# Patient Record
Sex: FEMALE | Race: WHITE | Employment: OTHER | URBAN - NONMETROPOLITAN AREA
[De-identification: names, ages, dates, MRNs, and addresses within clinical notes are randomized per-mention and may not be internally consistent; named-entity substitution may affect disease eponyms.]

---

## 2017-01-17 ENCOUNTER — OFFICE VISIT (OUTPATIENT)
Dept: FAMILY MEDICINE CLINIC | Age: 82
End: 2017-01-17

## 2017-01-17 ENCOUNTER — CARE COORDINATION (OUTPATIENT)
Dept: CARE COORDINATION | Age: 82
End: 2017-01-17

## 2017-01-17 VITALS
HEART RATE: 68 BPM | BODY MASS INDEX: 23.45 KG/M2 | DIASTOLIC BLOOD PRESSURE: 60 MMHG | SYSTOLIC BLOOD PRESSURE: 138 MMHG | WEIGHT: 128.2 LBS

## 2017-01-17 DIAGNOSIS — L30.9 DERMATITIS: Primary | ICD-10-CM

## 2017-01-17 PROCEDURE — G8420 CALC BMI NORM PARAMETERS: HCPCS | Performed by: FAMILY MEDICINE

## 2017-01-17 PROCEDURE — 1123F ACP DISCUSS/DSCN MKR DOCD: CPT | Performed by: FAMILY MEDICINE

## 2017-01-17 PROCEDURE — G8599 NO ASA/ANTIPLAT THER USE RNG: HCPCS | Performed by: FAMILY MEDICINE

## 2017-01-17 PROCEDURE — 99212 OFFICE O/P EST SF 10 MIN: CPT | Performed by: FAMILY MEDICINE

## 2017-01-17 PROCEDURE — 4040F PNEUMOC VAC/ADMIN/RCVD: CPT | Performed by: FAMILY MEDICINE

## 2017-01-17 PROCEDURE — 1036F TOBACCO NON-USER: CPT | Performed by: FAMILY MEDICINE

## 2017-01-17 PROCEDURE — G8484 FLU IMMUNIZE NO ADMIN: HCPCS | Performed by: FAMILY MEDICINE

## 2017-01-17 PROCEDURE — 1090F PRES/ABSN URINE INCON ASSESS: CPT | Performed by: FAMILY MEDICINE

## 2017-01-17 PROCEDURE — G8428 CUR MEDS NOT DOCUMENT: HCPCS | Performed by: FAMILY MEDICINE

## 2017-01-17 RX ORDER — AMLODIPINE BESYLATE 10 MG/1
10 TABLET ORAL DAILY
Qty: 30 TABLET | Refills: 3 | Status: SHIPPED | OUTPATIENT
Start: 2017-01-17 | End: 2017-04-25 | Stop reason: SDUPTHER

## 2017-01-17 RX ORDER — NYSTATIN 100000 [USP'U]/G
POWDER TOPICAL 2 TIMES DAILY
Qty: 1 BOTTLE | Refills: 5 | Status: SHIPPED | OUTPATIENT
Start: 2017-01-17

## 2017-01-20 RX ORDER — METOPROLOL TARTRATE 50 MG/1
50 TABLET, FILM COATED ORAL 2 TIMES DAILY
Qty: 180 TABLET | Refills: 1 | Status: SHIPPED | OUTPATIENT
Start: 2017-01-20 | End: 2017-05-04 | Stop reason: SDUPTHER

## 2017-01-20 RX ORDER — PANTOPRAZOLE SODIUM 40 MG/1
40 TABLET, DELAYED RELEASE ORAL
Qty: 90 TABLET | Refills: 1 | Status: SHIPPED | OUTPATIENT
Start: 2017-01-20 | End: 2017-05-04 | Stop reason: SDUPTHER

## 2017-02-09 ENCOUNTER — CARE COORDINATION (OUTPATIENT)
Dept: CARE COORDINATION | Age: 82
End: 2017-02-09

## 2017-03-09 ENCOUNTER — CARE COORDINATION (OUTPATIENT)
Dept: CARE COORDINATION | Age: 82
End: 2017-03-09

## 2017-03-14 ENCOUNTER — TELEPHONE (OUTPATIENT)
Dept: FAMILY MEDICINE CLINIC | Age: 82
End: 2017-03-14

## 2017-03-14 RX ORDER — PROMETHAZINE HYDROCHLORIDE 6.25 MG/5ML
6.25 SYRUP ORAL 3 TIMES DAILY PRN
Qty: 118 ML | Refills: 0 | Status: SHIPPED | OUTPATIENT
Start: 2017-03-14 | End: 2017-09-28 | Stop reason: SINTOL

## 2017-03-20 RX ORDER — GLIMEPIRIDE 1 MG/1
1 TABLET ORAL EVERY MORNING
Qty: 90 TABLET | Refills: 0 | Status: SHIPPED | OUTPATIENT
Start: 2017-03-20 | End: 2017-05-04 | Stop reason: SDUPTHER

## 2017-04-18 ENCOUNTER — CARE COORDINATION (OUTPATIENT)
Dept: CARE COORDINATION | Age: 82
End: 2017-04-18

## 2017-04-25 ENCOUNTER — OFFICE VISIT (OUTPATIENT)
Dept: FAMILY MEDICINE CLINIC | Age: 82
End: 2017-04-25

## 2017-04-25 VITALS — SYSTOLIC BLOOD PRESSURE: 123 MMHG | DIASTOLIC BLOOD PRESSURE: 64 MMHG | HEART RATE: 54 BPM

## 2017-04-25 DIAGNOSIS — F01.50 MULTI-INFARCT DEMENTIA WITHOUT BEHAVIORAL DISTURBANCE (HCC): Primary | ICD-10-CM

## 2017-04-25 DIAGNOSIS — M15.9 PRIMARY OSTEOARTHRITIS INVOLVING MULTIPLE JOINTS: ICD-10-CM

## 2017-04-25 DIAGNOSIS — E11.9 WELL CONTROLLED TYPE 2 DIABETES MELLITUS (HCC): ICD-10-CM

## 2017-04-25 DIAGNOSIS — N18.4 CKD (CHRONIC KIDNEY DISEASE) STAGE 4, GFR 15-29 ML/MIN (HCC): ICD-10-CM

## 2017-04-25 PROCEDURE — 1123F ACP DISCUSS/DSCN MKR DOCD: CPT | Performed by: FAMILY MEDICINE

## 2017-04-25 PROCEDURE — G8599 NO ASA/ANTIPLAT THER USE RNG: HCPCS | Performed by: FAMILY MEDICINE

## 2017-04-25 PROCEDURE — 1090F PRES/ABSN URINE INCON ASSESS: CPT | Performed by: FAMILY MEDICINE

## 2017-04-25 PROCEDURE — G8427 DOCREV CUR MEDS BY ELIG CLIN: HCPCS | Performed by: FAMILY MEDICINE

## 2017-04-25 PROCEDURE — 4040F PNEUMOC VAC/ADMIN/RCVD: CPT | Performed by: FAMILY MEDICINE

## 2017-04-25 PROCEDURE — 1036F TOBACCO NON-USER: CPT | Performed by: FAMILY MEDICINE

## 2017-04-25 PROCEDURE — 99213 OFFICE O/P EST LOW 20 MIN: CPT | Performed by: FAMILY MEDICINE

## 2017-04-25 PROCEDURE — G8420 CALC BMI NORM PARAMETERS: HCPCS | Performed by: FAMILY MEDICINE

## 2017-04-25 RX ORDER — AMLODIPINE BESYLATE 10 MG/1
10 TABLET ORAL DAILY
Qty: 30 TABLET | Refills: 3 | Status: SHIPPED | OUTPATIENT
Start: 2017-04-25 | End: 2017-05-04 | Stop reason: SDUPTHER

## 2017-05-04 RX ORDER — METOPROLOL TARTRATE 50 MG/1
50 TABLET, FILM COATED ORAL 2 TIMES DAILY
Qty: 180 TABLET | Refills: 1 | Status: SHIPPED | OUTPATIENT
Start: 2017-05-04

## 2017-05-04 RX ORDER — AMLODIPINE BESYLATE 10 MG/1
10 TABLET ORAL DAILY
Qty: 90 TABLET | Refills: 1 | Status: SHIPPED | OUTPATIENT
Start: 2017-05-04

## 2017-05-04 RX ORDER — GLIMEPIRIDE 1 MG/1
1 TABLET ORAL EVERY MORNING
Qty: 90 TABLET | Refills: 0 | Status: ON HOLD | OUTPATIENT
Start: 2017-05-04 | End: 2017-11-07 | Stop reason: HOSPADM

## 2017-05-04 RX ORDER — PANTOPRAZOLE SODIUM 40 MG/1
40 TABLET, DELAYED RELEASE ORAL
Qty: 90 TABLET | Refills: 1 | Status: SHIPPED | OUTPATIENT
Start: 2017-05-04 | End: 2017-08-24 | Stop reason: SINTOL

## 2017-05-15 ENCOUNTER — CARE COORDINATION (OUTPATIENT)
Dept: CARE COORDINATION | Age: 82
End: 2017-05-15

## 2017-06-21 ENCOUNTER — CARE COORDINATION (OUTPATIENT)
Dept: CARE COORDINATION | Age: 82
End: 2017-06-21

## 2017-06-22 ENCOUNTER — TELEPHONE (OUTPATIENT)
Dept: FAMILY MEDICINE CLINIC | Age: 82
End: 2017-06-22

## 2017-06-22 RX ORDER — TRIAMCINOLONE ACETONIDE 1 MG/G
CREAM TOPICAL
Qty: 28 G | Refills: 0 | Status: SHIPPED | OUTPATIENT
Start: 2017-06-22 | End: 2017-08-10 | Stop reason: SDUPTHER

## 2017-08-10 ENCOUNTER — OFFICE VISIT (OUTPATIENT)
Dept: FAMILY MEDICINE CLINIC | Age: 82
End: 2017-08-10
Payer: MEDICARE

## 2017-08-10 VITALS
SYSTOLIC BLOOD PRESSURE: 124 MMHG | HEART RATE: 66 BPM | DIASTOLIC BLOOD PRESSURE: 70 MMHG | BODY MASS INDEX: 22.16 KG/M2 | WEIGHT: 120.4 LBS | HEIGHT: 62 IN

## 2017-08-10 DIAGNOSIS — E11.9 TYPE 2 DIABETES MELLITUS WITHOUT COMPLICATION, WITHOUT LONG-TERM CURRENT USE OF INSULIN (HCC): Primary | Chronic | ICD-10-CM

## 2017-08-10 DIAGNOSIS — R21 RASH: ICD-10-CM

## 2017-08-10 DIAGNOSIS — E08.21 DIABETES MELLITUS DUE TO UNDERLYING CONDITION WITH DIABETIC NEPHROPATHY, WITHOUT LONG-TERM CURRENT USE OF INSULIN (HCC): ICD-10-CM

## 2017-08-10 DIAGNOSIS — F01.50 MULTI-INFARCT DEMENTIA WITHOUT BEHAVIORAL DISTURBANCE (HCC): Chronic | ICD-10-CM

## 2017-08-10 DIAGNOSIS — I35.0 MODERATE AORTIC STENOSIS: ICD-10-CM

## 2017-08-10 DIAGNOSIS — I10 ESSENTIAL HYPERTENSION: Chronic | ICD-10-CM

## 2017-08-10 PROCEDURE — 1036F TOBACCO NON-USER: CPT | Performed by: FAMILY MEDICINE

## 2017-08-10 PROCEDURE — G8427 DOCREV CUR MEDS BY ELIG CLIN: HCPCS | Performed by: FAMILY MEDICINE

## 2017-08-10 PROCEDURE — 99214 OFFICE O/P EST MOD 30 MIN: CPT | Performed by: FAMILY MEDICINE

## 2017-08-10 PROCEDURE — 4040F PNEUMOC VAC/ADMIN/RCVD: CPT | Performed by: FAMILY MEDICINE

## 2017-08-10 PROCEDURE — 1123F ACP DISCUSS/DSCN MKR DOCD: CPT | Performed by: FAMILY MEDICINE

## 2017-08-10 PROCEDURE — 1090F PRES/ABSN URINE INCON ASSESS: CPT | Performed by: FAMILY MEDICINE

## 2017-08-10 PROCEDURE — G8599 NO ASA/ANTIPLAT THER USE RNG: HCPCS | Performed by: FAMILY MEDICINE

## 2017-08-10 PROCEDURE — G8420 CALC BMI NORM PARAMETERS: HCPCS | Performed by: FAMILY MEDICINE

## 2017-08-10 RX ORDER — TRIAMCINOLONE ACETONIDE 1 MG/G
CREAM TOPICAL
Qty: 28 G | Refills: 0 | Status: SHIPPED | OUTPATIENT
Start: 2017-08-10

## 2017-08-10 ASSESSMENT — ENCOUNTER SYMPTOMS
SHORTNESS OF BREATH: 0
WHEEZING: 0

## 2017-08-11 LAB
ABSOLUTE BASO #: 0.1 K/UL (ref 0–0.1)
ABSOLUTE EOS #: 0.5 K/UL (ref 0.1–0.4)
ABSOLUTE LYMPH #: 1.5 K/UL (ref 0.8–5.2)
ABSOLUTE MONO #: 0.5 K/UL (ref 0.1–0.9)
ABSOLUTE NEUT #: 6.3 K/UL (ref 1.3–9.1)
ALBUMIN SERPL-MCNC: 3.6 G/DL (ref 3.2–5.3)
ALK PHOSPHATASE: 80 IU/L (ref 35–121)
ALT SERPL-CCNC: 7 IU/L (ref 5–59)
ANION GAP SERPL CALCULATED.3IONS-SCNC: 16 MMOL/L
AST SERPL-CCNC: 8 IU/L (ref 10–42)
AVERAGE GLUCOSE: 120 MG/DL (ref 66–114)
BASOPHILS RELATIVE PERCENT: 0.6 %
BILIRUB SERPL-MCNC: 0.4 MG/DL (ref 0.2–1.3)
BUN BLDV-MCNC: 38 MG/DL (ref 9–24)
CALCIUM SERPL-MCNC: 9.5 MG/DL (ref 8.7–10.8)
CHLORIDE BLD-SCNC: 103 MMOL/L (ref 95–111)
CO2: 24 MMOL/L (ref 21–32)
CREAT SERPL-MCNC: 3.4 MG/DL (ref 0.5–1.3)
EGFR AFRICAN AMERICAN: 15
EGFR IF NONAFRICAN AMERICAN: 13
EOSINOPHILS RELATIVE PERCENT: 5.1 %
GLUCOSE: 161 MG/DL (ref 70–100)
HBA1C MFR BLD: 5.8 % (ref 4.2–5.8)
HCT VFR BLD CALC: 28 % (ref 36–48)
HEMOGLOBIN: 9.1 G/DL (ref 12–16)
LYMPHOCYTE %: 16.7 %
MCH RBC QN AUTO: 27.8 PG (ref 27–34)
MCHC RBC AUTO-ENTMCNC: 32.5 G/DL (ref 31–36)
MCV RBC AUTO: 85.6 FL (ref 80–100)
MONOCYTES # BLD: 5.4 %
NEUTROPHILS RELATIVE PERCENT: 71.5 %
PDW BLD-RTO: 15.7 % (ref 10.8–14.8)
PLATELETS: 353 K/UL (ref 150–450)
POTASSIUM SERPL-SCNC: 4.7 MMOL/L (ref 3.5–5.4)
RBC: 3.27 M/UL (ref 4–5.5)
SODIUM BLD-SCNC: 138 MMOL/L (ref 134–147)
TOTAL PROTEIN: 7.3 G/DL (ref 5.8–8)
WBC: 8.8 K/UL (ref 3.7–10.8)

## 2017-08-12 ENCOUNTER — TELEPHONE (OUTPATIENT)
Dept: FAMILY MEDICINE CLINIC | Age: 82
End: 2017-08-12

## 2017-08-12 DIAGNOSIS — N18.4 CHRONIC KIDNEY DISEASE (CKD), STAGE 4 (SEVERE): Primary | ICD-10-CM

## 2017-08-12 PROBLEM — N18.9 CHRONIC KIDNEY DISEASE (CKD): Status: ACTIVE | Noted: 2017-08-12

## 2017-08-17 ENCOUNTER — HOSPITAL ENCOUNTER (OUTPATIENT)
Age: 82
Discharge: HOME | End: 2017-08-17
Payer: MEDICARE

## 2017-08-17 DIAGNOSIS — N18.4 CHRONIC KIDNEY DISEASE (CKD), STAGE 4 (SEVERE): ICD-10-CM

## 2017-08-17 LAB
ANION GAP SERPL CALCULATED.3IONS-SCNC: 17 MEQ/L (ref 8–16)
BUN BLDV-MCNC: 42 MG/DL (ref 7–22)
CALCIUM SERPL-MCNC: 9.2 MG/DL (ref 8.5–10.5)
CHLORIDE BLD-SCNC: 100 MEQ/L (ref 98–111)
CO2: 22 MEQ/L (ref 23–33)
CREAT SERPL-MCNC: 3.5 MG/DL (ref 0.4–1.2)
GFR SERPL CREATININE-BSD FRML MDRD: 12 ML/MIN/1.73M2
GLUCOSE BLD-MCNC: 264 MG/DL (ref 70–108)
POTASSIUM SERPL-SCNC: 5 MEQ/L (ref 3.5–5.2)
SODIUM BLD-SCNC: 139 MEQ/L (ref 135–145)

## 2017-08-17 PROCEDURE — 36415 COLL VENOUS BLD VENIPUNCTURE: CPT

## 2017-08-17 PROCEDURE — 80048 BASIC METABOLIC PNL TOTAL CA: CPT

## 2017-08-18 ENCOUNTER — TELEPHONE (OUTPATIENT)
Dept: FAMILY MEDICINE CLINIC | Age: 82
End: 2017-08-18

## 2017-08-18 ENCOUNTER — TELEPHONE (OUTPATIENT)
Dept: NEPHROLOGY | Age: 82
End: 2017-08-18

## 2017-08-24 ENCOUNTER — OFFICE VISIT (OUTPATIENT)
Dept: NEPHROLOGY | Age: 82
End: 2017-08-24
Payer: MEDICARE

## 2017-08-24 VITALS
DIASTOLIC BLOOD PRESSURE: 78 MMHG | BODY MASS INDEX: 23.69 KG/M2 | WEIGHT: 129.5 LBS | HEART RATE: 65 BPM | SYSTOLIC BLOOD PRESSURE: 162 MMHG | OXYGEN SATURATION: 91 %

## 2017-08-24 DIAGNOSIS — I10 ESSENTIAL HYPERTENSION: Chronic | ICD-10-CM

## 2017-08-24 DIAGNOSIS — N18.4 CKD (CHRONIC KIDNEY DISEASE) STAGE 4, GFR 15-29 ML/MIN (HCC): Primary | ICD-10-CM

## 2017-08-24 DIAGNOSIS — N18.5 CHRONIC KIDNEY DISEASE, STAGE V (VERY SEVERE) (HCC): ICD-10-CM

## 2017-08-24 DIAGNOSIS — N18.9 ANEMIA IN CHRONIC KIDNEY DISEASE: ICD-10-CM

## 2017-08-24 DIAGNOSIS — D63.1 ANEMIA IN CHRONIC KIDNEY DISEASE: ICD-10-CM

## 2017-08-24 DIAGNOSIS — N18.4 TYPE 2 DIABETES MELLITUS WITH STAGE 4 CHRONIC KIDNEY DISEASE, WITHOUT LONG-TERM CURRENT USE OF INSULIN (HCC): ICD-10-CM

## 2017-08-24 DIAGNOSIS — E11.22 TYPE 2 DIABETES MELLITUS WITH STAGE 4 CHRONIC KIDNEY DISEASE, WITHOUT LONG-TERM CURRENT USE OF INSULIN (HCC): ICD-10-CM

## 2017-08-24 PROCEDURE — 1090F PRES/ABSN URINE INCON ASSESS: CPT | Performed by: NURSE PRACTITIONER

## 2017-08-24 PROCEDURE — 99213 OFFICE O/P EST LOW 20 MIN: CPT | Performed by: NURSE PRACTITIONER

## 2017-08-24 PROCEDURE — 4040F PNEUMOC VAC/ADMIN/RCVD: CPT | Performed by: NURSE PRACTITIONER

## 2017-08-24 PROCEDURE — G8427 DOCREV CUR MEDS BY ELIG CLIN: HCPCS | Performed by: NURSE PRACTITIONER

## 2017-08-24 PROCEDURE — G8420 CALC BMI NORM PARAMETERS: HCPCS | Performed by: NURSE PRACTITIONER

## 2017-08-24 PROCEDURE — 1036F TOBACCO NON-USER: CPT | Performed by: NURSE PRACTITIONER

## 2017-08-24 PROCEDURE — 1123F ACP DISCUSS/DSCN MKR DOCD: CPT | Performed by: NURSE PRACTITIONER

## 2017-08-24 PROCEDURE — G8599 NO ASA/ANTIPLAT THER USE RNG: HCPCS | Performed by: NURSE PRACTITIONER

## 2017-09-25 ENCOUNTER — HOSPITAL ENCOUNTER (OUTPATIENT)
Age: 82
Discharge: HOME | End: 2017-09-25
Payer: MEDICARE

## 2017-09-25 DIAGNOSIS — N18.4 CKD (CHRONIC KIDNEY DISEASE) STAGE 4, GFR 15-29 ML/MIN (HCC): ICD-10-CM

## 2017-09-25 LAB
ANION GAP SERPL CALCULATED.3IONS-SCNC: 20 MEQ/L (ref 8–16)
BUN BLDV-MCNC: 47 MG/DL (ref 7–22)
CALCIUM SERPL-MCNC: 9.4 MG/DL (ref 8.5–10.5)
CHLORIDE BLD-SCNC: 101 MEQ/L (ref 98–111)
CO2: 19 MEQ/L (ref 23–33)
CREAT SERPL-MCNC: 3.3 MG/DL (ref 0.4–1.2)
GFR SERPL CREATININE-BSD FRML MDRD: 13 ML/MIN/1.73M2
GLUCOSE BLD-MCNC: 119 MG/DL (ref 70–108)
PHOSPHORUS: 4.4 MG/DL (ref 2.4–4.7)
POTASSIUM SERPL-SCNC: 4.4 MEQ/L (ref 3.5–5.2)
PTH INTACT: 95.2 PG/ML (ref 15–65)
SODIUM BLD-SCNC: 140 MEQ/L (ref 135–145)
VITAMIN D 25-HYDROXY: 20 NG/ML (ref 30–100)

## 2017-09-25 PROCEDURE — 80048 BASIC METABOLIC PNL TOTAL CA: CPT

## 2017-09-25 PROCEDURE — 84100 ASSAY OF PHOSPHORUS: CPT

## 2017-09-25 PROCEDURE — 83970 ASSAY OF PARATHORMONE: CPT

## 2017-09-25 PROCEDURE — 82306 VITAMIN D 25 HYDROXY: CPT

## 2017-09-25 PROCEDURE — 36415 COLL VENOUS BLD VENIPUNCTURE: CPT

## 2017-09-28 ENCOUNTER — OFFICE VISIT (OUTPATIENT)
Dept: NEPHROLOGY | Age: 82
End: 2017-09-28
Payer: MEDICARE

## 2017-09-28 VITALS — SYSTOLIC BLOOD PRESSURE: 152 MMHG | OXYGEN SATURATION: 96 % | HEART RATE: 59 BPM | DIASTOLIC BLOOD PRESSURE: 72 MMHG

## 2017-09-28 DIAGNOSIS — E87.20 METABOLIC ACIDOSIS: ICD-10-CM

## 2017-09-28 DIAGNOSIS — E11.22 TYPE 2 DIABETES MELLITUS WITH STAGE 4 CHRONIC KIDNEY DISEASE, WITHOUT LONG-TERM CURRENT USE OF INSULIN (HCC): ICD-10-CM

## 2017-09-28 DIAGNOSIS — N18.4 CKD (CHRONIC KIDNEY DISEASE) STAGE 4, GFR 15-29 ML/MIN (HCC): Primary | ICD-10-CM

## 2017-09-28 DIAGNOSIS — D63.1 ANEMIA IN CHRONIC KIDNEY DISEASE: ICD-10-CM

## 2017-09-28 DIAGNOSIS — N18.9 ANEMIA IN CHRONIC KIDNEY DISEASE: ICD-10-CM

## 2017-09-28 DIAGNOSIS — N18.4 TYPE 2 DIABETES MELLITUS WITH STAGE 4 CHRONIC KIDNEY DISEASE, WITHOUT LONG-TERM CURRENT USE OF INSULIN (HCC): ICD-10-CM

## 2017-09-28 DIAGNOSIS — E55.9 VITAMIN D DEFICIENCY: ICD-10-CM

## 2017-09-28 PROCEDURE — G8420 CALC BMI NORM PARAMETERS: HCPCS | Performed by: NURSE PRACTITIONER

## 2017-09-28 PROCEDURE — 1036F TOBACCO NON-USER: CPT | Performed by: NURSE PRACTITIONER

## 2017-09-28 PROCEDURE — 4040F PNEUMOC VAC/ADMIN/RCVD: CPT | Performed by: NURSE PRACTITIONER

## 2017-09-28 PROCEDURE — 99213 OFFICE O/P EST LOW 20 MIN: CPT | Performed by: NURSE PRACTITIONER

## 2017-09-28 PROCEDURE — 1123F ACP DISCUSS/DSCN MKR DOCD: CPT | Performed by: NURSE PRACTITIONER

## 2017-09-28 PROCEDURE — 1090F PRES/ABSN URINE INCON ASSESS: CPT | Performed by: NURSE PRACTITIONER

## 2017-09-28 PROCEDURE — G8599 NO ASA/ANTIPLAT THER USE RNG: HCPCS | Performed by: NURSE PRACTITIONER

## 2017-09-28 PROCEDURE — G8427 DOCREV CUR MEDS BY ELIG CLIN: HCPCS | Performed by: NURSE PRACTITIONER

## 2017-09-28 RX ORDER — SODIUM BICARBONATE 650 MG/1
650 TABLET ORAL 2 TIMES DAILY
Qty: 60 TABLET | Refills: 3 | Status: SHIPPED | OUTPATIENT
Start: 2017-09-28 | End: 2018-09-28

## 2017-10-31 ENCOUNTER — TELEPHONE (OUTPATIENT)
Dept: FAMILY MEDICINE CLINIC | Age: 82
End: 2017-10-31

## 2017-10-31 NOTE — TELEPHONE ENCOUNTER
Riley Fournier called in about her Noriega Shank. She says that Sydney Valdes has fell twice, doesn't want to eat, and states her stomach is upset. They were told that her kidney function is only about 25%. I discussed what they were thinking and they feel that maybe they would like Hospice in to evaluate her. She wonders if you will need to see Sydney Valdes in the office or not. We are to call Riley Fournier back.

## 2017-11-01 NOTE — TELEPHONE ENCOUNTER
Recommend appt in office to discuss. We can have hospice in to evaluate. Please advise patient.   Shania Huston MD

## 2017-11-02 ENCOUNTER — HOSPITAL ENCOUNTER (INPATIENT)
Age: 82
LOS: 4 days | Discharge: NURSING, REHAB OR LONG-TERM CARE | DRG: 689 | End: 2017-11-07
Attending: NURSE PRACTITIONER | Admitting: INTERNAL MEDICINE
Payer: MEDICARE

## 2017-11-02 ENCOUNTER — APPOINTMENT (OUTPATIENT)
Dept: CT IMAGING | Age: 82
DRG: 689 | End: 2017-11-02
Payer: MEDICARE

## 2017-11-02 DIAGNOSIS — R77.8 ELEVATED TROPONIN: Primary | ICD-10-CM

## 2017-11-02 DIAGNOSIS — N18.9 CHRONIC KIDNEY DISEASE, UNSPECIFIED CKD STAGE: ICD-10-CM

## 2017-11-02 DIAGNOSIS — D64.9 ANEMIA, UNSPECIFIED TYPE: ICD-10-CM

## 2017-11-02 DIAGNOSIS — R10.30 LOWER ABDOMINAL PAIN: ICD-10-CM

## 2017-11-02 LAB
ALBUMIN SERPL-MCNC: 3 G/DL (ref 3.5–5.1)
ALP BLD-CCNC: 111 U/L (ref 38–126)
ALT SERPL-CCNC: 9 U/L (ref 11–66)
ANION GAP SERPL CALCULATED.3IONS-SCNC: 22 MEQ/L (ref 8–16)
ANISOCYTOSIS: ABNORMAL
ANISOCYTOSIS: ABNORMAL
AST SERPL-CCNC: 15 U/L (ref 5–40)
BASOPHILS # BLD: 0 %
BASOPHILS # BLD: 0.3 %
BASOPHILS ABSOLUTE: 0 THOU/MM3 (ref 0–0.1)
BASOPHILS ABSOLUTE: 0 THOU/MM3 (ref 0–0.1)
BILIRUB SERPL-MCNC: 0.4 MG/DL (ref 0.3–1.2)
BILIRUBIN DIRECT: < 0.2 MG/DL (ref 0–0.3)
BILIRUBIN URINE: NEGATIVE
BLOOD, URINE: ABNORMAL
BUN BLDV-MCNC: 42 MG/DL (ref 7–22)
BUN, WHOLE BLOOD: 48 MG/DL (ref 8–26)
CALCIUM SERPL-MCNC: 8.4 MG/DL (ref 8.5–10.5)
CHARACTER, URINE: CLEAR
CHLORIDE BLD-SCNC: 91 MEQ/L (ref 98–111)
CHLORIDE, WHOLE BLOOD: 97 MEQ/L (ref 98–109)
CO2: 21 MEQ/L (ref 23–33)
COLOR: YELLOW
CREAT SERPL-MCNC: 3.6 MG/DL (ref 0.4–1.2)
CREAT SERPL-MCNC: 4.1 MG/DL (ref 0.5–1.2)
EOSINOPHIL # BLD: 2.3 %
EOSINOPHIL # BLD: 3.8 %
EOSINOPHILS ABSOLUTE: 0.3 THOU/MM3 (ref 0–0.4)
EOSINOPHILS ABSOLUTE: 0.5 THOU/MM3 (ref 0–0.4)
GFR SERPL CREATININE-BSD FRML MDRD: 12 ML/MIN/1.73M2
GFR, ESTIMATED: 11 ML/MIN/1.73M2
GLUCOSE BLD-MCNC: 136 MG/DL (ref 70–108)
GLUCOSE BLD-MCNC: 170 MG/DL (ref 70–108)
GLUCOSE, URINE: NEGATIVE MG/DL
GLUCOSE, WHOLE BLOOD: 43 MG/DL (ref 70–108)
HCT VFR BLD CALC: 23.3 % (ref 37–47)
HCT VFR BLD CALC: 25.3 % (ref 37–47)
HEMOGLOBIN: 7.4 GM/DL (ref 12–16)
HEMOGLOBIN: 8.2 GM/DL (ref 12–16)
HYPOCHROMIA: ABNORMAL
KETONES, URINE: NEGATIVE
LACTIC ACID: 1.5 MMOL/L (ref 0.5–2.2)
LEUKOCYTES, UA: ABNORMAL
LIPASE: 35 U/L (ref 5.6–51.3)
LYMPHOCYTES # BLD: 9.4 %
LYMPHOCYTES # BLD: 9.5 %
LYMPHOCYTES ABSOLUTE: 1.2 THOU/MM3 (ref 1–4.8)
LYMPHOCYTES ABSOLUTE: 1.3 THOU/MM3 (ref 1–4.8)
MAGNESIUM: 2.1 MG/DL (ref 1.6–2.4)
MCH RBC QN AUTO: 26.2 PG (ref 27–31)
MCH RBC QN AUTO: 26.9 PG (ref 27–31)
MCHC RBC AUTO-ENTMCNC: 32 GM/DL (ref 33–37)
MCHC RBC AUTO-ENTMCNC: 32.3 GM/DL (ref 33–37)
MCV RBC AUTO: 82 FL (ref 81–99)
MCV RBC AUTO: 83.3 FL (ref 81–99)
MONOCYTES # BLD: 3.1 %
MONOCYTES # BLD: 3.9 %
MONOCYTES ABSOLUTE: 0.4 THOU/MM3 (ref 0.4–1.3)
MONOCYTES ABSOLUTE: 0.5 THOU/MM3 (ref 0.4–1.3)
NITRITE, URINE: NEGATIVE
NUCLEATED RED BLOOD CELLS: 0 /100 WBC
NUCLEATED RED BLOOD CELLS: 0 /100 WBC
OSMOLALITY CALCULATION: 280.8 MOSMOL/KG (ref 275–300)
PDW BLD-RTO: 14.7 % (ref 11.5–14.5)
PDW BLD-RTO: 17.8 % (ref 11.5–14.5)
PH UA: 6 (ref 5–9)
PLATELET # BLD: 211 THOU/MM3 (ref 130–400)
PLATELET # BLD: 485 THOU/MM3 (ref 130–400)
PLATELET ESTIMATE: ADEQUATE
PMV BLD AUTO: 6.8 MCM (ref 7.4–10.4)
PMV BLD AUTO: 7.4 MCM (ref 7.4–10.4)
POIKILOCYTES: SLIGHT
POTASSIUM SERPL-SCNC: 3.7 MEQ/L (ref 3.5–5.2)
POTASSIUM, WHOLE BLOOD: 4.1 MEQ/L (ref 3.5–4.9)
PROTEIN UA: >= 300 MG/DL
RBC # BLD: 2.84 MILL/MM3 (ref 4.2–5.4)
RBC # BLD: 3.03 MILL/MM3 (ref 4.2–5.4)
REFLEX TO URINE C & S: ABNORMAL
SCAN OF BLOOD SMEAR: NORMAL
SEG NEUTROPHILS: 82.8 %
SEG NEUTROPHILS: 84.9 %
SEGMENTED NEUTROPHILS ABSOLUTE COUNT: 10.9 THOU/MM3 (ref 1.8–7.7)
SEGMENTED NEUTROPHILS ABSOLUTE COUNT: 11 THOU/MM3 (ref 1.8–7.7)
SODIUM BLD-SCNC: 129 MEQ/L (ref 138–146)
SODIUM BLD-SCNC: 134 MEQ/L (ref 135–145)
SPECIFIC GRAVITY UA: 1.02 (ref 1–1.03)
TOTAL CO2, WHOLE BLOOD: 25 MEQ/L (ref 23–33)
TOTAL PROTEIN: 7.6 G/DL (ref 6.1–8)
TROPONIN T: 0.24 NG/ML
TROPONIN T: 0.24 NG/ML
UROBILINOGEN, URINE: 0.2 EU/DL (ref 0–1)
WBC # BLD: 12.9 THOU/MM3 (ref 4.8–10.8)
WBC # BLD: 13.2 THOU/MM3 (ref 4.8–10.8)

## 2017-11-02 PROCEDURE — 84484 ASSAY OF TROPONIN QUANT: CPT

## 2017-11-02 PROCEDURE — 96374 THER/PROPH/DIAG INJ IV PUSH: CPT

## 2017-11-02 PROCEDURE — 36415 COLL VENOUS BLD VENIPUNCTURE: CPT

## 2017-11-02 PROCEDURE — 99214 OFFICE O/P EST MOD 30 MIN: CPT | Performed by: NURSE PRACTITIONER

## 2017-11-02 PROCEDURE — 85025 COMPLETE CBC W/AUTO DIFF WBC: CPT

## 2017-11-02 PROCEDURE — 87184 SC STD DISK METHOD PER PLATE: CPT

## 2017-11-02 PROCEDURE — 93005 ELECTROCARDIOGRAM TRACING: CPT

## 2017-11-02 PROCEDURE — 84520 ASSAY OF UREA NITROGEN: CPT

## 2017-11-02 PROCEDURE — 80051 ELECTROLYTE PANEL: CPT

## 2017-11-02 PROCEDURE — 99285 EMERGENCY DEPT VISIT HI MDM: CPT

## 2017-11-02 PROCEDURE — 2580000003 HC RX 258: Performed by: NURSE PRACTITIONER

## 2017-11-02 PROCEDURE — 74176 CT ABD & PELVIS W/O CONTRAST: CPT

## 2017-11-02 PROCEDURE — 80053 COMPREHEN METABOLIC PANEL: CPT

## 2017-11-02 PROCEDURE — 83605 ASSAY OF LACTIC ACID: CPT

## 2017-11-02 PROCEDURE — 82948 REAGENT STRIP/BLOOD GLUCOSE: CPT

## 2017-11-02 PROCEDURE — 87077 CULTURE AEROBIC IDENTIFY: CPT

## 2017-11-02 PROCEDURE — 83690 ASSAY OF LIPASE: CPT

## 2017-11-02 PROCEDURE — 82248 BILIRUBIN DIRECT: CPT

## 2017-11-02 PROCEDURE — 82947 ASSAY GLUCOSE BLOOD QUANT: CPT

## 2017-11-02 PROCEDURE — 87186 SC STD MICRODIL/AGAR DIL: CPT

## 2017-11-02 PROCEDURE — 83735 ASSAY OF MAGNESIUM: CPT

## 2017-11-02 PROCEDURE — 99215 OFFICE O/P EST HI 40 MIN: CPT

## 2017-11-02 PROCEDURE — 81003 URINALYSIS AUTO W/O SCOPE: CPT

## 2017-11-02 PROCEDURE — 87086 URINE CULTURE/COLONY COUNT: CPT

## 2017-11-02 PROCEDURE — 2580000003 HC RX 258: Performed by: PHYSICIAN ASSISTANT

## 2017-11-02 PROCEDURE — 87335 E COLI 0157 AG IA: CPT

## 2017-11-02 PROCEDURE — 82565 ASSAY OF CREATININE: CPT

## 2017-11-02 PROCEDURE — 96361 HYDRATE IV INFUSION ADD-ON: CPT

## 2017-11-02 RX ORDER — DEXTROSE MONOHYDRATE 50 MG/ML
INJECTION, SOLUTION INTRAVENOUS
Status: DISCONTINUED
Start: 2017-11-02 | End: 2017-11-02 | Stop reason: WASHOUT

## 2017-11-02 RX ORDER — DEXTROSE MONOHYDRATE 25 G/50ML
25 INJECTION, SOLUTION INTRAVENOUS PRN
Status: DISCONTINUED | OUTPATIENT
Start: 2017-11-02 | End: 2017-11-03 | Stop reason: ALTCHOICE

## 2017-11-02 RX ORDER — 0.9 % SODIUM CHLORIDE 0.9 %
1000 INTRAVENOUS SOLUTION INTRAVENOUS ONCE
Status: COMPLETED | OUTPATIENT
Start: 2017-11-02 | End: 2017-11-02

## 2017-11-02 RX ADMIN — SODIUM CHLORIDE 1000 ML: 9 INJECTION, SOLUTION INTRAVENOUS at 20:59

## 2017-11-02 RX ADMIN — DEXTROSE MONOHYDRATE 25 G: 25 INJECTION, SOLUTION INTRAVENOUS at 19:47

## 2017-11-02 ASSESSMENT — ENCOUNTER SYMPTOMS
COLOR CHANGE: 0
VOMITING: 0
RHINORRHEA: 0
ABDOMINAL PAIN: 1
COUGH: 0
BLOOD IN STOOL: 0
SHORTNESS OF BREATH: 0
NAUSEA: 1
TROUBLE SWALLOWING: 0
DIARRHEA: 0
EYE REDNESS: 0
EYE DISCHARGE: 0
CONSTIPATION: 0
SORE THROAT: 0
BACK PAIN: 0
WHEEZING: 0

## 2017-11-02 NOTE — ED TRIAGE NOTES
Patient wheeled to room by family. C/o intermittent abdominal pain, nausea, and lack of appetite beginning one week ago. Denies abdominal pain at this time. States last bowel movement yesterday. Denies pain with urination. Denies urinary frequency. Per daughters, patient lives at home alone.

## 2017-11-02 NOTE — ED PROVIDER NOTES
[address]  Urgent Care Encounter      CHIEF COMPLAINT       Chief Complaint   Patient presents with    Nausea     abdominal pain       Nurses Notes reviewed and I agree except as noted in the HPI. HISTORY OF PRESENT ILLNESS   Kenya Benavides is a 80 y.o. female who presents with her daughters. They are concerned about intermittent nausea, low abdominal pain and weakness. She lives independently and the daughters bring her meals. They have noticed a decrease in appetite but says she is drinking well. The pain does not wake her or keep her awake at night. She appears to not feel well but is in no acute distress. REVIEW OF SYSTEMS     Review of Systems   Constitutional: Positive for appetite change and fatigue. Negative for chills and fever. HENT: Negative for trouble swallowing. Respiratory: Negative for cough and shortness of breath. Cardiovascular: Negative for chest pain. Gastrointestinal: Positive for abdominal pain (low) and nausea. Negative for blood in stool, constipation, diarrhea and vomiting. Genitourinary: Positive for enuresis (wears depends). Negative for difficulty urinating, dysuria, frequency and urgency. Neurological: Positive for weakness. Negative for light-headedness and headaches. PAST MEDICAL HISTORY         Diagnosis Date    Cerebral artery occlusion with cerebral infarction ([address]     Chronic kidney disease (CKD), active medical management without dialysis, stage 4 (severe) ([address]     Hyperlipidemia     Hypertension     Type II or unspecified type diabetes mellitus without mention of complication, not stated as uncontrolled        SURGICAL HISTORY     Patient  has a past surgical history that includes glaucoma repair and Hysterectomy.     CURRENT MEDICATIONS       Previous Medications    AMLODIPINE (NORVASC) 10 MG TABLET    Take 1 tablet by mouth daily    GLIMEPIRIDE (AMARYL) 1 MG TABLET    Take 1 tablet by mouth every morning    METOPROLOL MCHC 32.0 (L) 33.0 - 37.0 gm/dl    RDW 14.7 (H) 11.5 - 14.5 %    Platelets 597 315 - 223 thou/mm3    MPV 7.4 7.4 - 10.4 mcm   POC Basic Metabol Panel without Calcium   Result Value Ref Range    Sodium 129 (L) 138 - 146 meq/l    Potassium, Whole Blood 4.1 3.5 - 4.9 meq/l    Chloride, Whole Blood 97 (L) 98 - 109 meq/l    TOTAL CO2, WHOLE BLOOD 25 23 - 33 meq/l    Glucose, Whole Blood 43 (L) 70 - 108 mg/dl    BUN, WHOLE BLOOD 48 (H) 8 - 26 mg/dl    CREATININE 4.1 (HH) 0.5 - 1.2 mg/dl   UA without Microscopic Reflex C&S   Result Value Ref Range    Glucose, Urine Negative NEGATIVE mg/dl    Bilirubin Urine Negative NEGATIVE    Ketones, Urine Negative NEGATIVE    Specific Gravity, UA 1.025 1.002 - 1.03    Blood, Urine Small (A) NEGATIVE    pH, UA 6.00 5.0 - 9.0    Protein, UA >= 300 (A) NEGATIVE mg/dl    Urobilinogen, Urine 0.20 0.0 - 1.0 eu/dl    Nitrite, Urine Negative NEGATIVE    LEUKOCYTES, UA Trace (A) NEGATIVE    Color, UA Yellow STRAW-YELL    Character, Urine Clear CLEAR-SL C    REFLEX TO URINE C & S INDICATED    Glomerular Filtration Rate, Estimated   Result Value Ref Range    GFR, Estimated 11 ml/min/1.73m2       IMAGING:    URGENT CARE COURSE:     Vitals:    11/02/17 1813   BP: (!) 141/66   Pulse: 67   Resp: 16   Temp: 97.6 °F (36.4 °C)   TempSrc: Temporal   SpO2: 96%   Weight: 150 lb (68 kg)   Height: 5' 1\" (1.549 m)       Medications - No data to display  PROCEDURES:  None  FINAL IMPRESSION      1. Lower abdominal pain    2. Anemia, unspecified type    3. General weakness    4. Nausea without vomiting        DISPOSITION/PLAN   DISPOSITION   I spoke with the patient and her daughters regarding transfer to [address] [place] [address] for further evaluation and treatment. They did verbalize that she is a DNR CC. They consulted with another sister by phone. Report was given to [facility] BRIANA SOTO. Patient's blood sugar came back at 43. She was given 50% dextrose 25 g IV push prior to transfer.   She was transferred by [facility]. PATIENT REFERRED TO:  No follow-up provider specified.   DISCHARGE MEDICATIONS:  New Prescriptions    No medications on file     Current Discharge Medication List          Sharad Fernandes, [address]  11/02/17 9292

## 2017-11-02 NOTE — TELEPHONE ENCOUNTER
I spoke with Silke Truong. She states her Mom had an episode of Stomach pain, but is now feeling better. We discussed this and Silke Truong will take her to the [facility] today if [facility] will agree, and she has scheduled for the AM with Dr. Aditya Springer.

## 2017-11-03 LAB
ANION GAP SERPL CALCULATED.3IONS-SCNC: 18 MEQ/L (ref 8–16)
ANISOCYTOSIS: ABNORMAL
BASOPHILS # BLD: 0.2 %
BASOPHILS ABSOLUTE: 0 THOU/MM3 (ref 0–0.1)
BUN BLDV-MCNC: 40 MG/DL (ref 7–22)
CALCIUM SERPL-MCNC: 8.1 MG/DL (ref 8.5–10.5)
CHLORIDE BLD-SCNC: 92 MEQ/L (ref 98–111)
CHOLESTEROL, TOTAL: 137 MG/DL (ref 100–199)
CO2: 23 MEQ/L (ref 23–33)
CREAT SERPL-MCNC: 3.7 MG/DL (ref 0.4–1.2)
E.COLI O157:H7: NORMAL
EKG ATRIAL RATE: 69 BPM
EKG ATRIAL RATE: 74 BPM
EKG P AXIS: 74 DEGREES
EKG P AXIS: 86 DEGREES
EKG P-R INTERVAL: 142 MS
EKG P-R INTERVAL: 152 MS
EKG Q-T INTERVAL: 452 MS
EKG Q-T INTERVAL: 478 MS
EKG QRS DURATION: 90 MS
EKG QRS DURATION: 90 MS
EKG QTC CALCULATION (BAZETT): 501 MS
EKG QTC CALCULATION (BAZETT): 512 MS
EKG R AXIS: 28 DEGREES
EKG R AXIS: 64 DEGREES
EKG T AXIS: -56 DEGREES
EKG T AXIS: -60 DEGREES
EKG VENTRICULAR RATE: 69 BPM
EKG VENTRICULAR RATE: 74 BPM
EOSINOPHIL # BLD: 2.9 %
EOSINOPHILS ABSOLUTE: 0.4 THOU/MM3 (ref 0–0.4)
FERRITIN: 212 NG/ML (ref 10–291)
GFR SERPL CREATININE-BSD FRML MDRD: 11 ML/MIN/1.73M2
GLUCOSE BLD-MCNC: 118 MG/DL (ref 70–108)
GLUCOSE BLD-MCNC: 162 MG/DL (ref 70–108)
GLUCOSE BLD-MCNC: 40 MG/DL (ref 70–108)
GLUCOSE BLD-MCNC: 48 MG/DL (ref 70–108)
GLUCOSE BLD-MCNC: 50 MG/DL (ref 70–108)
GLUCOSE BLD-MCNC: 75 MG/DL (ref 70–108)
GLUCOSE BLD-MCNC: 95 MG/DL (ref 70–108)
HCT VFR BLD CALC: 21.8 % (ref 37–47)
HDLC SERPL-MCNC: 36 MG/DL
HEMOGLOBIN: 7.1 GM/DL (ref 12–16)
HYPOCHROMIA: ABNORMAL
IRON: 30 UG/DL (ref 50–170)
LDL CHOLESTEROL CALCULATED: 78 MG/DL
LV EF: 48 %
LVEF MODALITY: NORMAL
LYMPHOCYTES # BLD: 9.5 %
LYMPHOCYTES ABSOLUTE: 1.1 THOU/MM3 (ref 1–4.8)
MCH RBC QN AUTO: 26.8 PG (ref 27–31)
MCHC RBC AUTO-ENTMCNC: 32.6 GM/DL (ref 33–37)
MCV RBC AUTO: 82.3 FL (ref 81–99)
MONOCYTES # BLD: 5.8 %
MONOCYTES ABSOLUTE: 0.7 THOU/MM3 (ref 0.4–1.3)
NUCLEATED RED BLOOD CELLS: 0 /100 WBC
OSMOLALITY CALCULATION: 272.9 MOSMOL/KG (ref 275–300)
PDW BLD-RTO: 16.9 % (ref 11.5–14.5)
PLATELET # BLD: 403 THOU/MM3 (ref 130–400)
PMV BLD AUTO: 6.7 MCM (ref 7.4–10.4)
POTASSIUM SERPL-SCNC: 3.3 MEQ/L (ref 3.5–5.2)
RBC # BLD: 2.65 MILL/MM3 (ref 4.2–5.4)
RETICULOCYTE ABSOLUTE COUNT: 3.9 % (ref 0.5–2)
SEG NEUTROPHILS: 81.6 %
SEGMENTED NEUTROPHILS ABSOLUTE COUNT: 9.9 THOU/MM3 (ref 1.8–7.7)
SODIUM BLD-SCNC: 133 MEQ/L (ref 135–145)
TRIGL SERPL-MCNC: 117 MG/DL (ref 0–199)
TROPONIN T: 0.23 NG/ML
TROPONIN T: 0.24 NG/ML
TSH SERPL DL<=0.05 MIU/L-ACNC: 0.99 UIU/ML (ref 0.4–4.2)
WBC # BLD: 12.1 THOU/MM3 (ref 4.8–10.8)

## 2017-11-03 PROCEDURE — 6360000002 HC RX W HCPCS: Performed by: INTERNAL MEDICINE

## 2017-11-03 PROCEDURE — 85045 AUTOMATED RETICULOCYTE COUNT: CPT

## 2017-11-03 PROCEDURE — 83540 ASSAY OF IRON: CPT

## 2017-11-03 PROCEDURE — 97162 PT EVAL MOD COMPLEX 30 MIN: CPT

## 2017-11-03 PROCEDURE — 80048 BASIC METABOLIC PNL TOTAL CA: CPT

## 2017-11-03 PROCEDURE — G8978 MOBILITY CURRENT STATUS: HCPCS

## 2017-11-03 PROCEDURE — 84466 ASSAY OF TRANSFERRIN: CPT

## 2017-11-03 PROCEDURE — 93306 TTE W/DOPPLER COMPLETE: CPT

## 2017-11-03 PROCEDURE — 99222 1ST HOSP IP/OBS MODERATE 55: CPT | Performed by: INTERNAL MEDICINE

## 2017-11-03 PROCEDURE — 82728 ASSAY OF FERRITIN: CPT

## 2017-11-03 PROCEDURE — 84484 ASSAY OF TROPONIN QUANT: CPT

## 2017-11-03 PROCEDURE — 85025 COMPLETE CBC W/AUTO DIFF WBC: CPT

## 2017-11-03 PROCEDURE — 6370000000 HC RX 637 (ALT 250 FOR IP): Performed by: INTERNAL MEDICINE

## 2017-11-03 PROCEDURE — 2500000003 HC RX 250 WO HCPCS: Performed by: INTERNAL MEDICINE

## 2017-11-03 PROCEDURE — 93005 ELECTROCARDIOGRAM TRACING: CPT

## 2017-11-03 PROCEDURE — 36415 COLL VENOUS BLD VENIPUNCTURE: CPT

## 2017-11-03 PROCEDURE — G8979 MOBILITY GOAL STATUS: HCPCS

## 2017-11-03 PROCEDURE — 99222 1ST HOSP IP/OBS MODERATE 55: CPT | Performed by: NURSE PRACTITIONER

## 2017-11-03 PROCEDURE — 84238 ASSAY NONENDOCRINE RECEPTOR: CPT

## 2017-11-03 PROCEDURE — 2580000003 HC RX 258: Performed by: INTERNAL MEDICINE

## 2017-11-03 PROCEDURE — 84443 ASSAY THYROID STIM HORMONE: CPT

## 2017-11-03 PROCEDURE — A6212 FOAM DRG <=16 SQ IN W/BORDER: HCPCS

## 2017-11-03 PROCEDURE — 6370000000 HC RX 637 (ALT 250 FOR IP): Performed by: NURSE PRACTITIONER

## 2017-11-03 PROCEDURE — 1200000003 HC TELEMETRY R&B

## 2017-11-03 PROCEDURE — 82948 REAGENT STRIP/BLOOD GLUCOSE: CPT

## 2017-11-03 PROCEDURE — 80061 LIPID PANEL: CPT

## 2017-11-03 PROCEDURE — 97110 THERAPEUTIC EXERCISES: CPT

## 2017-11-03 RX ORDER — SODIUM CHLORIDE 9 MG/ML
INJECTION, SOLUTION INTRAVENOUS CONTINUOUS
Status: DISCONTINUED | OUTPATIENT
Start: 2017-11-03 | End: 2017-11-03

## 2017-11-03 RX ORDER — HEPARIN SODIUM 5000 [USP'U]/ML
5000 INJECTION, SOLUTION INTRAVENOUS; SUBCUTANEOUS EVERY 8 HOURS SCHEDULED
Status: DISCONTINUED | OUTPATIENT
Start: 2017-11-03 | End: 2017-11-07 | Stop reason: HOSPADM

## 2017-11-03 RX ORDER — NITROGLYCERIN 0.4 MG/1
0.4 TABLET SUBLINGUAL EVERY 5 MIN PRN
Status: DISCONTINUED | OUTPATIENT
Start: 2017-11-03 | End: 2017-11-07 | Stop reason: HOSPADM

## 2017-11-03 RX ORDER — ASPIRIN 81 MG/1
81 TABLET, CHEWABLE ORAL DAILY
Status: DISCONTINUED | OUTPATIENT
Start: 2017-11-04 | End: 2017-11-07 | Stop reason: HOSPADM

## 2017-11-03 RX ORDER — SODIUM BICARBONATE 650 MG/1
650 TABLET ORAL 2 TIMES DAILY
Status: DISCONTINUED | OUTPATIENT
Start: 2017-11-03 | End: 2017-11-07 | Stop reason: HOSPADM

## 2017-11-03 RX ORDER — AMLODIPINE BESYLATE 10 MG/1
10 TABLET ORAL DAILY
Status: DISCONTINUED | OUTPATIENT
Start: 2017-11-03 | End: 2017-11-07 | Stop reason: HOSPADM

## 2017-11-03 RX ORDER — GLIMEPIRIDE 1 MG/1
1 TABLET ORAL EVERY MORNING
Status: DISCONTINUED | OUTPATIENT
Start: 2017-11-03 | End: 2017-11-03

## 2017-11-03 RX ORDER — ACETAMINOPHEN 325 MG/1
650 TABLET ORAL EVERY 4 HOURS PRN
Status: DISCONTINUED | OUTPATIENT
Start: 2017-11-03 | End: 2017-11-07 | Stop reason: HOSPADM

## 2017-11-03 RX ORDER — DEXTROSE MONOHYDRATE 50 MG/ML
100 INJECTION, SOLUTION INTRAVENOUS PRN
Status: DISCONTINUED | OUTPATIENT
Start: 2017-11-03 | End: 2017-11-07 | Stop reason: HOSPADM

## 2017-11-03 RX ORDER — TRIAMCINOLONE ACETONIDE 1 MG/G
CREAM TOPICAL 2 TIMES DAILY
Status: DISCONTINUED | OUTPATIENT
Start: 2017-11-03 | End: 2017-11-07 | Stop reason: HOSPADM

## 2017-11-03 RX ORDER — DEXTROSE MONOHYDRATE 50 MG/ML
INJECTION, SOLUTION INTRAVENOUS CONTINUOUS
Status: DISCONTINUED | OUTPATIENT
Start: 2017-11-03 | End: 2017-11-04

## 2017-11-03 RX ORDER — ONDANSETRON 2 MG/ML
4 INJECTION INTRAMUSCULAR; INTRAVENOUS EVERY 6 HOURS PRN
Status: DISCONTINUED | OUTPATIENT
Start: 2017-11-03 | End: 2017-11-07 | Stop reason: HOSPADM

## 2017-11-03 RX ORDER — NICOTINE POLACRILEX 4 MG
15 LOZENGE BUCCAL PRN
Status: DISCONTINUED | OUTPATIENT
Start: 2017-11-03 | End: 2017-11-07 | Stop reason: HOSPADM

## 2017-11-03 RX ORDER — DEXTROSE MONOHYDRATE 25 G/50ML
12.5 INJECTION, SOLUTION INTRAVENOUS PRN
Status: DISCONTINUED | OUTPATIENT
Start: 2017-11-03 | End: 2017-11-04

## 2017-11-03 RX ORDER — SODIUM CHLORIDE 0.9 % (FLUSH) 0.9 %
10 SYRINGE (ML) INJECTION PRN
Status: DISCONTINUED | OUTPATIENT
Start: 2017-11-03 | End: 2017-11-07 | Stop reason: HOSPADM

## 2017-11-03 RX ORDER — METOPROLOL TARTRATE 50 MG/1
50 TABLET, FILM COATED ORAL 2 TIMES DAILY
Status: DISCONTINUED | OUTPATIENT
Start: 2017-11-03 | End: 2017-11-07 | Stop reason: HOSPADM

## 2017-11-03 RX ORDER — POTASSIUM CHLORIDE 20 MEQ/1
20 TABLET, EXTENDED RELEASE ORAL ONCE
Status: COMPLETED | OUTPATIENT
Start: 2017-11-03 | End: 2017-11-03

## 2017-11-03 RX ORDER — SODIUM CHLORIDE 0.9 % (FLUSH) 0.9 %
10 SYRINGE (ML) INJECTION EVERY 12 HOURS SCHEDULED
Status: DISCONTINUED | OUTPATIENT
Start: 2017-11-03 | End: 2017-11-07 | Stop reason: HOSPADM

## 2017-11-03 RX ADMIN — DEXTROSE MONOHYDRATE 12.5 G: 25 INJECTION, SOLUTION INTRAVENOUS at 15:18

## 2017-11-03 RX ADMIN — METOPROLOL TARTRATE 50 MG: 50 TABLET, FILM COATED ORAL at 08:34

## 2017-11-03 RX ADMIN — SODIUM CHLORIDE: 9 INJECTION, SOLUTION INTRAVENOUS at 03:01

## 2017-11-03 RX ADMIN — Medication 10 ML: at 08:33

## 2017-11-03 RX ADMIN — HEPARIN SODIUM 5000 UNITS: 5000 INJECTION, SOLUTION INTRAVENOUS; SUBCUTANEOUS at 22:05

## 2017-11-03 RX ADMIN — DEXTROSE MONOHYDRATE: 50 INJECTION, SOLUTION INTRAVENOUS at 04:48

## 2017-11-03 RX ADMIN — SODIUM BICARBONATE 650 MG: 650 TABLET ORAL at 22:05

## 2017-11-03 RX ADMIN — DEXTROSE MONOHYDRATE 12.5 G: 25 INJECTION, SOLUTION INTRAVENOUS at 04:48

## 2017-11-03 RX ADMIN — SODIUM BICARBONATE 650 MG: 650 TABLET ORAL at 08:34

## 2017-11-03 RX ADMIN — METOPROLOL TARTRATE 50 MG: 50 TABLET, FILM COATED ORAL at 22:06

## 2017-11-03 RX ADMIN — TRIAMCINOLONE ACETONIDE: 1 CREAM TOPICAL at 08:33

## 2017-11-03 RX ADMIN — POTASSIUM CHLORIDE 20 MEQ: 1500 TABLET, EXTENDED RELEASE ORAL at 22:06

## 2017-11-03 RX ADMIN — MICONAZOLE NITRATE: 2 POWDER TOPICAL at 08:33

## 2017-11-03 RX ADMIN — AMLODIPINE BESYLATE 10 MG: 10 TABLET ORAL at 08:34

## 2017-11-03 RX ADMIN — HEPARIN SODIUM 5000 UNITS: 5000 INJECTION, SOLUTION INTRAVENOUS; SUBCUTANEOUS at 15:07

## 2017-11-03 ASSESSMENT — PAIN SCALES - GENERAL
PAINLEVEL_OUTOF10: 0

## 2017-11-03 NOTE — PROGRESS NOTES
Verified that Dr. Ericka Molina wants heparin given with low blood counts, he stated that it will be ok to give as the patient's blood counts are low due to chronic kidney disease. Requested an order to replace potassium as her creatinine level is too high for protocol,  stated she does not yet need replaced.  Requested the patient be made a DNR-CCA per patient and family request.

## 2017-11-03 NOTE — CONSULTS
[facility]    [address]  [facility]  11/3/2017 2:14 PM    Pt Name:    Tea Banks  MRN:     [id number]   [id number]  YOB: 1922  Admit Date:    11/2/2017  6:11 PM  Primary Care Physician:  Declan Banegas MD    Reason for Consult:  CKD    Admit Chief Complaint: Abdominal pain    History:   The patient is a 80y.o. year old frail elderly appearing  female who reports she had abdominal pain. She presented to urgent care and was sent to the ED due to CT abd with distended gallbladder, elevated troponin. She has typically had a low appetite and wasn't drinking much. She is a poor historian due to dementia. She follows with Bennye Meigs for her CKD IV and her baseline creatinine is around 3.0-3.3. She is diabetic on oral meds, has HTN, and previous history of CVA. She has dementia and is a poor historian. Her home meds list includes bicarbonate, glimeperide, amlodipine, metoprolol. She denies fever, chills, light headedness, dizziness, chest pain, shortness of breath, nausea, vomiting, diarrhea. Past Medical History:  Past Medical History:   Diagnosis Date    [facility] artery occlusion with [facility]     Chronic kidney disease (CKD), active medical management without dialysis, stage 4 (severe) ([address]     Hyperlipidemia     Hypertension     Type II or unspecified type diabetes mellitus without mention of complication, not stated as uncontrolled        Past Surgical History:  Past Surgical History:   Procedure Laterality Date    GLAUCOMA REPAIR      HYSTERECTOMY         Family History:  History reviewed. No pertinent family history. Social History:  Social History     Social History    Marital status:      Spouse name: N/A    Number of children: N/A    Years of education: N/A     Occupational History    Not on file.      Social History Main Topics    Smoking status: Never consult. Please feel free to call me if you have any questions.      Fredy Olivares, CNP  [facility]

## 2017-11-03 NOTE — ED NOTES
Patient resting on bed with eyes closed. Respirations easy/unlabored. Family remains at bedside. Lights off for comfort. Will continue to monitor.       [address] [place] [address] St, RN  11/02/17 [address] Ailyn Rinaldi RN  11/02/17 2815

## 2017-11-03 NOTE — CARE COORDINATION
DISCHARGE BARRIERS  11/3/17, 11:44 AM    Reason for Referral: may need ECF ([facility] in past), confused  Mental Status: Patient is alert but confused  Decision Making: Patient is assisted with family for decision making. Family/Social/Home Environment: Assessment completed with Patient's daughter and Patient. Patient resides alone in a one story home. Patient has 5 children with all of them taking turns throughout the day to assist with her daily ADL's. Family does all cooking and cleaning. Patient does have a life alert system and uses a walker in the home. Patient and daughter are agreeable to TCU at this time for continued therapy if needed. Current Services: none  Current Equipment: see above  Payment Source: Medicare, [employer]  Concerns or Barriers to Discharge:  Not at this time  Collabrative List of ECF/HH were provided: not at this time    Teach Back Method used with Patient regarding care plan and discharge plan. Patient and daughter verbalize understanding of the plan of care and contribute to goal setting. Anticipated Needs/Discharge Plan: Patient discharge to TCU/ECF vs home.      Electronically signed by TIERRA Rodriguez, SABRINA on 11/3/2017 at 11:44 AM

## 2017-11-03 NOTE — ED NOTES
Patient resting on cot with family at bedside. Patient updated on plan of care and oral contrast started. RN educated patient and family on proper intake of oral contrast.  Patient and family stated an understanding. Will continue to monitor.       [address] [place] [address]  11/02/17 1092

## 2017-11-03 NOTE — ED NOTES
Patient arrived to ED from [place] urgent [facility], in which she had went for abdominal pain. Patient denies abdominal pain at this time, however her blood sugar was in the 40's at [facility] and she was given orange juice and D5, her blood sugar went up to 200's. Patient resting on cot with family at bedside. Patient denies any pain at this time just has c/o being cold. A warm blanket was given for comfort. Patient has no complaints at this time. Will continue to monitor.       Arlene Bryant RN  11/02/17 2019

## 2017-11-03 NOTE — ED NOTES
Patient back from CT, resting on cot, with family at bedside. Door closed for comfort. Will continue to monitor.       [address] [facility] St, RN  11/02/17 2660

## 2017-11-03 NOTE — PROGRESS NOTES
Patient oriented to person. Disoriented to time, place, and situation. Speech clear and appropriate. PERRL. Mucous membranes pink and moist. Respirations even and unlabored on 2L nasal cannula. Lung sounds clear throughout. Heart tones audible and regular. Abdomen soft and non tender. Bowel sounds active all four quadrants. Radial pulse 3+. Hand grasp weak. Cap refill and skin turgor greater than 3 seconds. Sensation present in all four extremities with no numbness or tingling. Purposeful movement in all four extremities present. Pedal push and pull weak bilaterally. Pedal pulses 2+. No edema noted.

## 2017-11-03 NOTE — CONSULTS
cream   Topical BID Naila Granados MD        sodium chloride flush 0.9 % injection 10 mL  10 mL Intravenous 2 times per day Naila Granados MD   10 mL at 11/03/17 4036    sodium chloride flush 0.9 % injection 10 mL  10 mL Intravenous PRN Naila Granados MD        acetaminophen (TYLENOL) tablet 650 mg  650 mg Oral Q4H PRN Naila Granados MD        ondansetron (ZOFRAN) injection 4 mg  4 mg Intravenous Q6H PRN MD Yady Shea [START ON 11/4/2017] aspirin chewable tablet 81 mg  81 mg Oral Daily Naila Granados MD        nitroGLYCERIN (NITROSTAT) SL tablet 0.4 mg  0.4 mg Sublingual Q5 Min PRN Naila Granados MD        heparin (porcine) injection 5,000 Units  5,000 Units Subcutaneous 3 times per day Naila Granados MD        glucose (GLUTOSE) 40 % oral gel 15 g  15 g Oral PRN Naila Granados MD        dextrose 50 % solution 12.5 g  12.5 g Intravenous PRN Naila Granados MD   12.5 g at 11/03/17 0448    glucagon (rDNA) injection 1 mg  1 mg Intramuscular PRN Naila Granados MD        dextrose 5 % solution  100 mL/hr Intravenous PRN Naila Granados MD        dextrose 5 % solution   Intravenous Continuous Naila Granados MD 50 mL/hr at 11/03/17 0831       Prior to Admission medications    Medication Sig Start Date End Date Taking? Authorizing Provider   sodium bicarbonate 650 MG tablet Take 1 tablet by mouth 2 times daily 9/28/17 9/28/18 Yes Isma John CNP   triamcinolone (KENALOG) 0.1 % cream Apply topically 2 times daily.   Patient taking differently: Apply topically 2 times daily prn. 8/10/17  Yes Zachary Luajn MD   amLODIPine (NORVASC) 10 MG tablet Take 1 tablet by mouth daily 5/4/17  Yes Jesus Bolanos MD   glimepiride (AMARYL) 1 MG tablet Take 1 tablet by mouth every morning 5/4/17  Yes Jesus Bolanos MD   metoprolol tartrate (LOPRESSOR) 50 MG tablet Take 1 tablet by mouth 2 times daily 5/4/17  Yes Jesus Bolanos MD   nystatin (MYCOSTATIN) 296092 UNIT/GM powder Apply topically 2 times daily  Patient taking differently: Apply topically 2 times daily as needed  1/17/17  Yes Alyssa Osuna MD   Scheduled Meds:   amLODIPine  10 mg Oral Daily    metoprolol tartrate  50 mg Oral BID    miconazole   Topical BID    sodium bicarbonate  650 mg Oral BID    triamcinolone   Topical BID    sodium chloride flush  10 mL Intravenous 2 times per day    [START ON 11/4/2017] aspirin  81 mg Oral Daily    heparin (porcine)  5,000 Units Subcutaneous 3 times per day     Continuous Infusions:   dextrose      dextrose 50 mL/hr at 11/03/17 0831     PRN Meds:.sodium chloride flush, acetaminophen, ondansetron, nitroGLYCERIN, glucose, dextrose, glucagon (rDNA), dextrose    Allergies   Allergen Reactions    E-Mycin [Erythromycin] Nausea Only    Lotensin [Benazepril Hcl] Rash     History reviewed. No pertinent family history. Social History     Social History    Marital status:      Spouse name: N/A    Number of children: N/A    Years of education: N/A     Occupational History    Not on file. Social History Main Topics    Smoking status: Never Smoker    Smokeless tobacco: Never Used    Alcohol use No    Drug use: No    Sexual activity: Not on file     Other Topics Concern    Not on file     Social History Narrative    No narrative on file     ROS:   Constitutional: Denies any recent wt change, fever or fatigue  Eyes:  Denies any blurring or double vision, no glaucoma  Ears/Nose/Mouth/Throat:  Denies any chronic sinus/rhinitis, nosebleeds or bleeding gums  Cardiovascular:  As described above.   Denies any orthopnea, paroxysmal nocturnal dyspnea or claudication  Respiratory:  Denies any frequent cough, wheezing or coughing up blood  Genitourinary:  Denies difficulty with urination and kidney stones  Gastrointestinal:  Denies any chronic problems with abdominal pain, nausea, vomiting or diarrhea  Musculoskeletal:  Denies any joint pain, back pain, or difficulty walking  Integumentary:  Denies any rash  Neurological:  Denies any history of CVA or TIA  Endocrine:  Denies any history of Diabetes or thyroid disease  Hematologic/Lymphatic:  Denies any history of bleeding or bruising tendencies, DVT or PE     Labs:  CBC: Recent Labs      11/02/17 1903 11/02/17 2044 11/03/17   0302   WBC  13.2*  12.9*  12.1*   HGB  7.4*  8.2*  7.1*   HCT  23.3*  25.3*  21.8*   MCV  82  83.3  82.3   PLT  211  485*  403*     BMP: Recent Labs      11/02/17 1903 11/02/17 2044 11/03/17   0302   NA  129*  134*  133*   K  4.1  3.7  3.3*   CL   --   91*  92*   CO2   --   21*  23   BUN   --   42*  40*   CREATININE  4.1*  3.6*  3.7*   MG   --   2.1   --      Accucheck Glucoses:   Recent Labs      11/02/17 2027 11/03/17   0505   POCGLU  170*  162*     Cardiac Enzymes: No results for input(s): CKTOTAL, CKMB, CKMBINDEX, TROPONINI in the last 72 hours. PT/INR: No results for input(s): PROTIME, INR in the last 72 hours. APTT: No results for input(s): APTT in the last 72 hours.   Liver Profile:  Lab Results   Component Value Date    AST 15 11/02/2017    ALT 9 11/02/2017    BILIDIR <0.2 11/02/2017    BILITOT 0.4 11/02/2017    ALKPHOS 111 11/02/2017     Lab Results   Component Value Date    CHOL 137 11/03/2017    HDL 36 11/03/2017    TRIG 117 11/03/2017     TSH:   Lab Results   Component Value Date    TSH 0.991 11/03/2017     UA: Lab Results   Component Value Date    COLORU Yellow 11/02/2017    PHUR 6.00 11/02/2017    LABCAST NONE SEEN 08/27/2016    WBCUA 5-10 08/27/2016    RBCUA 0-2 08/27/2016    YEAST NONE SEEN 04/23/2016    BACTERIA NONE 08/27/2016    SPECGRAV 1.025 11/02/2017    LEUKOCYTESUR Trace 11/02/2017    LEUKOCYTESUR NEGATIVE 08/27/2016    LEUKOCYTESUR NEGATIVE 04/23/2016    UROBILINOGEN 0.20 11/02/2017    BILIRUBINUR Negative 11/02/2017    BLOODU Small 11/02/2017    GLUCOSEU NEGATIVE 04/23/2016    AMORPHOUS DEBRIS 08/27/2016           Diagnostic Data:  Echo:   Results for orders placed during the hospital encounter of 04/23/16   Echocardiogram 2D/ M-Mode/ Colorflow/ Do    Narrative Transthoracic Echocardiography Report (TTE)     Demographics      Patient Name    Jonelle 33 Gender               Female      MR #            [id number]      Race                                                      Ethnicity      Account #       [de-identified]        Room Number          4A-17      Accession       [id number]      Date of Study        04/25/2016   Number      Date of Birth   11/28/1922     Referring Physician  Rebecca Fletcher MD      Age             80 year(s)     Sonographer          Dirk Dye                                     Interpreting         Echo reader of the                                  Physician            haley Tang DO     Procedure    Type of Study      TTE procedure:ECHOCARDIOGRAM COMPLETE 2D W DOPPLER W COLOR. Procedure Date  Date: 04/25/2016 Start: 03:03 PM    Study Location: Bedside  Technical Quality: Good visualization    Indications:Transient ischemic attack. Additional Medical History:Hyperlipidemia, diabetes, hypertension, basal  cell cancer    Patient Status: Routine    Height: 61 inches Weight: 131 pounds BSA: 1.58 m^2 BMI: 24.75 kg/m^2    BP: 160/72 mmHg     Conclusions      Summary   Systolic function was normal.   Ejection fraction is visually estimated at 55%. Right ventricular systolic pressure of 57 mm Hg consistent with moderate   pulmonary hypertension. Leaflets exhibited moderately increased thickness and moderately reduced   cuspal separation of the aortic valve. Mild aortic regurgitation is noted. The maximum aortic valve gradient is 35 mmHg, the mean gradient is 22   mmHg, and the peak velocity is 295 cm/s. There is moderate aortic stenosis with valve area of 0.91 sq cm. Mild calcification of the posterior leaflet of the mitral valve.

## 2017-11-03 NOTE — PLAN OF CARE
Problem: DISCHARGE BARRIERS  Goal: Patient's continuum of care needs are met  Outcome: Ongoing  Patient discharge TCU/ECF vs home. See SW notes.

## 2017-11-03 NOTE — PLAN OF CARE
Problem: Discharge Planning:  Goal: Participates in care planning  Participates in care planning  Outcome: Ongoing  Palliative care in to see pt. Purpose of palliative care explained, goals of care discussed. Pt states that she wants \"simple care\", this nurse asked, \" do you mean reasonable, conservative care? \"  Pt stated, \"yes, no machines. \"  Supportive conversation followed, pt was not able to related why she is here. Conversation concluded with pt stating no questions or needs. Emotional support and encouragement given. Call placed to pt's daughter, and Emeli Funk, to verify code status and goals of care. The 3 different code status explained with Venice Navas stating understanding and no questions. Venice Navas states pt is not to have any \"heroic\" and no \"life support, even for short term. Venice Navas also expresses wanting only conservative care for pt. Support given. Writer will contact Dr Coleman Molina to update him. Pt's primary nurse,JOSE A Baxter updated on above conversation. Text message sent to Dr Coleman Molina via perfect serve to update him on above conversation. Palliative care will continue to follow and assist with advance care planning as appropriate.

## 2017-11-03 NOTE — FLOWSHEET NOTE
11/03/17 0454   Provider Notification   Reason for Communication Evaluate   Provider Name    Provider Notification Physician   Method of Communication Call   Response See orders   Notification Time 0454   Shift Event Other (comment)  (hypoglycemia orders)     Suggested to type & cross match pt for low RBC, HGH, HCT  said he would not type & cross match. Suggested for a patel insertion because of kidney function labs & fall risk, he does not want to insert a patel. Got an order for hypoglycemic replacement protocols. Pushed 1 amp of D50 & began 5% dextrose @ 100mL/hr. Will recheck sugar in 15 minutes & continue to monitor.

## 2017-11-03 NOTE — ED PROVIDER NOTES
[facility]  eMERGENCY dEPARTMENT eNCOUnter          [address] [facility]       Chief Complaint   Patient presents with    Nausea     abdominal pain       Nurses Notes reviewed and I agree except as noted in the HPI. HISTORY OF PRESENT ILLNESS    Juan Diego Napoles is a 80 y.o. female who presents to the Emergency Department via EMS for the evaluation of intermittent left-sided abdominal pain onset 1 week ago. The patient's daughters state that the pain was significant this morning but that it has since resolved. The patient was transferred here from Urgent Care via EMS for further evaluation and management. Her daughters report that this pain does not seem to worsen with eating. Her daughters also report nausea, weakness, and a decreased appetite. At Urgent Care, the patient's blood sugar was noted to be 43. She was given 50% dextrose 25 g IV push. Patient denies any fever, chills, vomiting, diarrhea, constipation, blood in her urine or stool, chest pain, or shortness of breath. The patient has history of CKD, anemia secondary to chronic disease, angiodysplasia of the colon, GI hemorrhage, diverticulosis, and dementia. There are no additional complaints at this time. Location/Symptom: intermittent left-sided abdominal pain  Timing/Onset: 1 week ago  Context/Setting: Her daughters state she also has nausea, weakness, and a decreased appetite. Quality: None stated. Duration: Intermittent  Modifying Factors: None stated. Severity: No pain currently. The HPI was provided by the patient and her daughters. REVIEW OF SYSTEMS     Review of Systems   Constitutional: Positive for appetite change (decreased). Negative for chills, fatigue and fever. HENT: Negative for congestion, ear pain, rhinorrhea and sore throat. Eyes: Negative for discharge and redness. Respiratory: Negative for cough, shortness of breath and wheezing. Cardiovascular: Negative for chest pain and palpitations. Gastrointestinal: Positive for abdominal pain (left) and nausea. Negative for blood in stool, constipation, diarrhea and vomiting. Genitourinary: Negative for difficulty urinating, dysuria, hematuria and vaginal discharge. Musculoskeletal: Negative for arthralgias, back pain, myalgias, neck pain and neck stiffness. Skin: Negative for color change, pallor and rash. Neurological: Positive for weakness. Negative for dizziness, syncope, light-headedness, numbness and headaches. Hematological: Negative for adenopathy. Psychiatric/Behavioral: Positive for confusion (history of dementia). Negative for agitation, dysphoric mood and suicidal ideas. The patient is not nervous/anxious. PAST MEDICAL HISTORY    has a past medical history of Cerebral artery occlusion with cerebral infarction ([facility] [address] Chronic kidney disease (CKD), active medical management without dialysis, stage 4 (severe) ([facility] [address] Hyperlipidemia; Hypertension; and Type II or unspecified type diabetes mellitus without mention of complication, not stated as uncontrolled. SURGICAL HISTORY      has a past surgical history that includes glaucoma repair and Hysterectomy. CURRENT MEDICATIONS       Previous Medications    AMLODIPINE (NORVASC) 10 MG TABLET    Take 1 tablet by mouth daily    GLIMEPIRIDE (AMARYL) 1 MG TABLET    Take 1 tablet by mouth every morning    METOPROLOL TARTRATE (LOPRESSOR) 50 MG TABLET    Take 1 tablet by mouth 2 times daily    NYSTATIN (MYCOSTATIN) 993674 UNIT/GM POWDER    Apply topically 2 times daily    SODIUM BICARBONATE 650 MG TABLET    Take 1 tablet by mouth 2 times daily    TRIAMCINOLONE (KENALOG) 0.1 % CREAM    Apply topically 2 times daily. ALLERGIES     is allergic to e-mycin [erythromycin] and lotensin [benazepril hcl]. FAMILY HISTORY     indicated that her mother is . She indicated that her father is . family history is not on file.     SOCIAL HISTORY      reports that she has never smoked. She has never used smokeless tobacco. She reports that she does not drink alcohol or use drugs. PHYSICAL EXAM     INITIAL VITALS:  height is 5' 1\" (1.549 m) and weight is 150 lb (68 kg). Her oral temperature is 98.1 °F (36.7 °C). Her blood pressure is 142/82 (abnormal) and her pulse is 67. Her respiration is 20 and oxygen saturation is 95%. Physical Exam   Constitutional: She is oriented to person, place, and time. She appears well-developed and well-nourished. She is active and cooperative. Non-toxic appearance. She does not have a sickly appearance. She does not appear ill. No distress. HENT:   Head: Normocephalic and atraumatic. Right Ear: External ear normal.   Left Ear: External ear normal.   Nose: Nose normal.   Mouth/Throat: Oropharynx is clear and moist.   Eyes: Conjunctivae and EOM are normal. Pupils are equal, round, and reactive to light. Right eye exhibits no discharge. Left eye exhibits no discharge. No scleral icterus. Neck: Normal range of motion. Neck supple. Cardiovascular: Normal rate, regular rhythm and normal heart sounds. Exam reveals no gallop and no friction rub. No murmur heard. Pulmonary/Chest: Effort normal and breath sounds normal. No respiratory distress. She has no decreased breath sounds. She has no wheezes. She has no rhonchi. She has no rales. She exhibits no tenderness. Abdominal: Soft. Bowel sounds are normal. She exhibits no distension, no abdominal bruit, no pulsatile midline mass and no mass. There is no hepatosplenomegaly. There is no tenderness. There is no rigidity, no rebound, no guarding, no tenderness at McBurney's point and negative George's sign. Musculoskeletal: Normal range of motion. She exhibits no edema. Lymphadenopathy:     She has no cervical adenopathy. Neurological: She is alert and oriented to person, place, and time. She exhibits normal muscle tone. She displays no seizure activity. Coordination normal. GCS eye subscore is 4. ANION GAP - Abnormal; Notable for the following: Anion Gap 22.0 (*)     All other components within normal limits   GLOMERULAR FILTRATION RATE, ESTIMATED - Abnormal; Notable for the following:     Est, Glom Filt Rate 12 (*)     All other components within normal limits   TROPONIN - Abnormal; Notable for the following:     Troponin T 0.243 (*)     All other components within normal limits   POCT GLUCOSE - Abnormal; Notable for the following:     POC Glucose 170 (*)     All other components within normal limits   URINE CULTURE, REFLEXED   GLOMERULAR FILTRATION RATE, ESTIMATED   LIPASE   MAGNESIUM   LACTIC ACID, PLASMA   SCAN OF BLOOD SMEAR   OSMOLALITY       EMERGENCY DEPARTMENT COURSE:   Vitals:    Vitals:    11/02/17 2024 11/02/17 2105 11/02/17 2227 11/02/17 2338   BP: 139/75 139/83 128/66 (!) 142/82   Pulse: 63 63 64 67   Resp: 19 20 19 20   Temp: 98.1 °F (36.7 °C)      TempSrc: Oral      SpO2: 94% 92% 94% 95%   Weight:       Height:           8:32 PM: The patient was seen and evaluated. MDM:  The patient was seen and evaluated within the ED today with intermittent LLQ abdominal pain, nausea, and weakness. Within the department, I observed the patient's vital signs to be within acceptable range, and she was afebrile. On exam, I appreciated no abdominal tenderness or peritoneal findings. Her physical exam was unremarkable. Radiologic studies within the department revealed a distended gallbladder without signs of cholecystitis. There was no other acute intra-abdominal or intrapelvic pathology noted. Laboratory work Revealed a white blood cell count of 12.9. Red blood cell count 3.03, hemoglobin 8.2, hematocrit 25.3. BUN 42, creatinine 3.6, GFR 12 consistent with patient's history of CKD. Patient's point of care blood glucose was 170. EKG negative for STEMI, although troponin and repeat troponin was 0.243. UA was positive for trace pyuria as well as hematuria.  Within the department, the patient was SIMON 11:43 PM 11/02/17             Gilbert Blakely PA-C  11/03/17 0930

## 2017-11-03 NOTE — H&P
Internal Medicine  History and Physical    Patient: Werner Patel  MRN: [id number]      History Obtained From:  patient, electronic medical record  PCP: Ebony Jackson MD    CHIEF COMPLAINT:  abd pain    HISTORY OF PRESENT ILLNESS:   The patient is a 80 y.o. female who presents with abd pain. Pt is a poor historian but denies CP, no SOB, no cough. Seen in ED, CT abd showed distended gallbladder, no other aggressive features noted but her labs showed elevated troponin in the setting of CKD, thus admitted. Past Medical History:        Diagnosis Date    Cerebral artery occlusion with cerebral infarction ([facility] [address]     Chronic kidney disease (CKD), active medical management without dialysis, stage 4 (severe) ([address]     Hyperlipidemia     Hypertension     Type II or unspecified type diabetes mellitus without mention of complication, not stated as uncontrolled        Past Surgical History:        Procedure Laterality Date    GLAUCOMA REPAIR      HYSTERECTOMY         Medications Prior to Admission:    Prior to Admission medications    Medication Sig Start Date End Date Taking? Authorizing Provider   sodium bicarbonate 650 MG tablet Take 1 tablet by mouth 2 times daily 9/28/17 9/28/18 Yes Honorio Marcelino CNP   triamcinolone (KENALOG) 0.1 % cream Apply topically 2 times daily.   Patient taking differently: Apply topically 2 times daily prn. 8/10/17  Yes Ebony Jackson MD   amLODIPine (NORVASC) 10 MG tablet Take 1 tablet by mouth daily 5/4/17  Yes Robyn Joshi MD   glimepiride (AMARYL) 1 MG tablet Take 1 tablet by mouth every morning 5/4/17  Yes Robyn Joshi MD   metoprolol tartrate (LOPRESSOR) 50 MG tablet Take 1 tablet by mouth 2 times daily 5/4/17  Yes Robyn Joshi MD   nystatin (MYCOSTATIN) [id number] UNIT/GM powder Apply topically 2 times daily  Patient taking differently: Apply topically 2 times daily as needed  1/17/17  Yes Robyn Joshi MD       Allergies:  E-mycin [erythromycin] and Lotensin there is no redness, warmth, or swelling of the joints  NEUROLOGIC:  Mental Status Exam:  Level of Alertness:   awake  Orientation:   person  Memory:   normal  Cranial Nerves:  cranial nerves II-XII are grossly intact  Motor Exam:  Motor exam is symmetrical 5 out of 5 all extremities bilaterally  SKIN:  no redness, warmth, or swelling      CBC:   Recent Labs      11/02/17 1903 11/02/17 2044 11/03/17   0302   WBC  13.2*  12.9*  12.1*   HGB  7.4*  8.2*  7.1*   PLT  211  485*  403*     BMP:    Recent Labs      11/02/17 1903 11/02/17 2044 11/03/17   0302   NA  129*  134*  133*   K  4.1  3.7  3.3*   CL   --   91*  92*   CO2   --   21*  23   BUN   --   42*  40*   CREATININE  4.1*  3.6*  3.7*   GLUCOSE   --   136*  40*     Hepatic:   Recent Labs      11/02/17 2044   AST  15   ALT  9*   BILITOT  0.4   ALKPHOS  111   Results for Vandana Miguel (MRN [id number]) as of 11/3/2017 13:48   Ref. Range 11/2/2017 20:44 11/2/2017 22:25 11/3/2017 03:02 11/3/2017 07:10   Troponin T Latest Units: ng/ml 0.243 (A) 0.243 (A) 0.238 (A) 0.230 (A)     Lipids:   Recent Labs      11/03/17   0302   CHOL  137   HDL  36      Ref. Range 11/3/2017 03:02   LDL Calculated Latest Units: mg/dL 78       EKG: NSR 69 bpm, KRISS, PACs    11/02/17 1921    Specimen Source: Urine, clean catch     Glucose, Urine Negative mg/dl    Bilirubin Urine Negative    Ketones, Urine Negative    Specific Gravity, UA 1.025    Blood, Urine Small (A)    pH, UA 6.00    Protein, UA >= 300 (A) mg/dl    Urobilinogen, Urine 0.20 eu/dl    Nitrite, Urine Negative    LEUKOCYTES, UA Trace (A)    Color, UA Yellow    Character, Urine Clear    REFLEX TO URINE C & S INDICATED     11/03/17 1252    Specimen Source: Urine, clean catch     Organism enteric gram negative bacilli (A)    Urine Culture Reflex Alvin count: >100,000 CFU/mL         Assessment and Plan   1. abd pain, non specific  2. Elevated troponin in the setting of CKD. 3. HTN  4. VIVIANA  5. Baseline CKD stage 4  6.  DM 2 with hypoglycemia. 7. UTI  8. Anemia of chronic Dx-renal dx  9. Chronic systolic CHF  10. Severe Aortic stenosis    Keep on pepcid. Gentle hydration. Dc oral hypoglycemic- amaryl. Antibiotics. PT/OT  Am labs, iron studies. Cardiology and renal consults.     Patient Active Problem List   Diagnosis Code    Unstable gait R26.81    Type 2 diabetes mellitus without complication, without long-term current use of insulin ([facility]) E11.9    Ischemic cerebrovascular accident (CVA) of frontal lobe ([facility]) I63.9    Left thyroid nodule E04.1    CKD (chronic kidney disease) stage 4, GFR 15-29 ml/min ([facility]) N18.4    Moderate aortic stenosis I35.0    Moderate mitral regurgitation I34.0    Moderate tricuspid regurgitation I07.1    Pulmonary hypertension I27.20    Multinodular goiter E04.2    Multi-infarct dementia without behavioral disturbance F01.50    Essential hypertension I10    Gastrointestinal hemorrhage with hematemesis K92.0    Mastalgia  right N64.4    Syncope and collapse R55    Gastritis and duodenitis K29.90    Angiodysplasia of colon K55.20    Diverticulosis of large intestine K57.30    Acute posthemorrhagic anemia D62    Chronic kidney disease (CKD) N18.9    Type 2 diabetes mellitus with stage 4 chronic kidney disease, without long-term current use of insulin ([facility]) E11.22, N18.4    Anemia in chronic kidney disease N18.9, D63.1    Vitamin D deficiency E21.6    Metabolic acidosis W37.0       Asmita Chris MD  Admitting Internist

## 2017-11-03 NOTE — PROGRESS NOTES
above for details. Assessment: Body structures, Functions, Activity limitations: Decreased functional mobility , Decreased endurance, Decreased balance, Decreased strength, Decreased safe awareness, Decreased cognition  Assessment: Patient tolerated PT session fairly well. She demos impairments in strength, balance, activity tolerance, safety, cognition and mobility. She is not safe to d/c home alone. She is in need of continued PT at an ECF at this time. Prognosis: Fair    Clinical Presentation: Moderate - Evolving with Changing Characteristics: . Due to an analysis of data from a detailed assessment, a consideration of several treatment options, the presence of co morbidities that affect the POC, and the need for minimal to moderate modifications or assistance needed to complete the evaluation. Decision Making: Moderate Complexitybased on patient assessment and decision making process of determining plan of care and establishing reasonable expectations for measurable functional outcomes    REQUIRES PT FOLLOW UP: Yes  Discharge Recommendations: Continue to assess pending progress, ECF with PT, Patient would benefit from continued therapy after discharge    Patient Education:  Patient Education: Role of PT, POC, transfers, safety, LE ex    Equipment Recommendations: Other: will continue to assess    Safety:  Type of devices:  All fall risk precautions in place, Left in chair, Call light within reach, Nurse notified, Chair alarm in place, Gait belt    Plan:  Times per week: 3-5x GM  Current Treatment Recommendations: Strengthening, Safety Education & Training, Balance Training, Patient/Caregiver Education & Training, Endurance Training, Functional Mobility Training, Equipment Evaluation, Education, & procurement, Transfer Training, Gait Training    Goals:  Patient goals : Get stronger  Short term goals  Time Frame for Short term goals: 2 weeks  Short term goal 1: Patient will complete bed mobility with mod

## 2017-11-03 NOTE — ED NOTES
Patient attempted to use bedpan. Patient had a bowel movement in her brief. Patient was cleaned up and positioned in bed. Patient and family updated on admission. Will continue to monitor.       [address] [place] [address]  11/03/17 1305

## 2017-11-04 LAB
ANION GAP SERPL CALCULATED.3IONS-SCNC: 16 MEQ/L (ref 8–16)
BUN BLDV-MCNC: 41 MG/DL (ref 7–22)
CALCIUM SERPL-MCNC: 8 MG/DL (ref 8.5–10.5)
CHLORIDE BLD-SCNC: 90 MEQ/L (ref 98–111)
CHLORIDE, URINE: < 20 MEQ/L
CO2: 22 MEQ/L (ref 23–33)
CREAT SERPL-MCNC: 3.5 MG/DL (ref 0.4–1.2)
GFR SERPL CREATININE-BSD FRML MDRD: 12 ML/MIN/1.73M2
GLUCOSE BLD-MCNC: 168 MG/DL (ref 70–108)
GLUCOSE BLD-MCNC: 50 MG/DL (ref 70–108)
GLUCOSE BLD-MCNC: 69 MG/DL (ref 70–108)
GLUCOSE BLD-MCNC: 80 MG/DL (ref 70–108)
HCT VFR BLD CALC: 23 % (ref 37–47)
HEMOGLOBIN: 7.4 GM/DL (ref 12–16)
IRON SATURATION: 9 % (ref 20–50)
IRON: 17 UG/DL (ref 50–170)
MCH RBC QN AUTO: 26.6 PG (ref 27–31)
MCHC RBC AUTO-ENTMCNC: 32.1 GM/DL (ref 33–37)
MCV RBC AUTO: 83 FL (ref 81–99)
ORGANISM: ABNORMAL
PDW BLD-RTO: 18 % (ref 11.5–14.5)
PLATELET # BLD: 430 THOU/MM3 (ref 130–400)
PMV BLD AUTO: 6.8 MCM (ref 7.4–10.4)
POTASSIUM SERPL-SCNC: 4.3 MEQ/L (ref 3.5–5.2)
RBC # BLD: 2.77 MILL/MM3 (ref 4.2–5.4)
SODIUM BLD-SCNC: 128 MEQ/L (ref 135–145)
SODIUM URINE: < 20 MEQ/L
SOLUBLE TRANSFERRIN RECEPT: 4.7 MG/L (ref 1.9–4.4)
TOTAL IRON BINDING CAPACITY: 193 UG/DL (ref 171–450)
TRANSFERRIN: 182 MG/DL (ref 200–400)
URINE CULTURE REFLEX: ABNORMAL
WBC # BLD: 14.4 THOU/MM3 (ref 4.8–10.8)

## 2017-11-04 PROCEDURE — 36415 COLL VENOUS BLD VENIPUNCTURE: CPT

## 2017-11-04 PROCEDURE — 84300 ASSAY OF URINE SODIUM: CPT

## 2017-11-04 PROCEDURE — 85027 COMPLETE CBC AUTOMATED: CPT

## 2017-11-04 PROCEDURE — 99232 SBSQ HOSP IP/OBS MODERATE 35: CPT | Performed by: INTERNAL MEDICINE

## 2017-11-04 PROCEDURE — 82948 REAGENT STRIP/BLOOD GLUCOSE: CPT

## 2017-11-04 PROCEDURE — 80048 BASIC METABOLIC PNL TOTAL CA: CPT

## 2017-11-04 PROCEDURE — 6370000000 HC RX 637 (ALT 250 FOR IP): Performed by: INTERNAL MEDICINE

## 2017-11-04 PROCEDURE — 2580000003 HC RX 258: Performed by: INTERNAL MEDICINE

## 2017-11-04 PROCEDURE — 1200000003 HC TELEMETRY R&B

## 2017-11-04 PROCEDURE — 6360000002 HC RX W HCPCS: Performed by: INTERNAL MEDICINE

## 2017-11-04 PROCEDURE — 83550 IRON BINDING TEST: CPT

## 2017-11-04 PROCEDURE — 83540 ASSAY OF IRON: CPT

## 2017-11-04 PROCEDURE — 99231 SBSQ HOSP IP/OBS SF/LOW 25: CPT | Performed by: NURSE PRACTITIONER

## 2017-11-04 PROCEDURE — 82436 ASSAY OF URINE CHLORIDE: CPT

## 2017-11-04 RX ORDER — DEXTROSE MONOHYDRATE 25 G/50ML
25 INJECTION, SOLUTION INTRAVENOUS PRN
Status: DISCONTINUED | OUTPATIENT
Start: 2017-11-04 | End: 2017-11-07 | Stop reason: HOSPADM

## 2017-11-04 RX ORDER — CEPHALEXIN 250 MG/1
250 CAPSULE ORAL EVERY 12 HOURS SCHEDULED
Status: DISCONTINUED | OUTPATIENT
Start: 2017-11-04 | End: 2017-11-07 | Stop reason: HOSPADM

## 2017-11-04 RX ADMIN — TRIAMCINOLONE ACETONIDE: 1 CREAM TOPICAL at 09:47

## 2017-11-04 RX ADMIN — HEPARIN SODIUM 5000 UNITS: 5000 INJECTION, SOLUTION INTRAVENOUS; SUBCUTANEOUS at 06:29

## 2017-11-04 RX ADMIN — SODIUM BICARBONATE 650 MG: 650 TABLET ORAL at 09:46

## 2017-11-04 RX ADMIN — DEXTROSE MONOHYDRATE: 50 INJECTION, SOLUTION INTRAVENOUS at 02:05

## 2017-11-04 RX ADMIN — MICONAZOLE NITRATE: 2 POWDER TOPICAL at 09:48

## 2017-11-04 RX ADMIN — AMLODIPINE BESYLATE 10 MG: 10 TABLET ORAL at 09:46

## 2017-11-04 RX ADMIN — SODIUM BICARBONATE 650 MG: 650 TABLET ORAL at 20:21

## 2017-11-04 RX ADMIN — CEPHALEXIN 250 MG: 250 CAPSULE ORAL at 20:20

## 2017-11-04 RX ADMIN — Medication 10 ML: at 21:45

## 2017-11-04 RX ADMIN — METOPROLOL TARTRATE 50 MG: 50 TABLET, FILM COATED ORAL at 21:43

## 2017-11-04 RX ADMIN — ASPIRIN 81 MG: 81 TABLET, CHEWABLE ORAL at 09:46

## 2017-11-04 RX ADMIN — MICONAZOLE NITRATE: 2 POWDER TOPICAL at 20:21

## 2017-11-04 RX ADMIN — HEPARIN SODIUM 5000 UNITS: 5000 INJECTION, SOLUTION INTRAVENOUS; SUBCUTANEOUS at 14:05

## 2017-11-04 RX ADMIN — METOPROLOL TARTRATE 50 MG: 50 TABLET, FILM COATED ORAL at 09:46

## 2017-11-04 RX ADMIN — CEFTRIAXONE SODIUM 1 G: 1 INJECTION, POWDER, FOR SOLUTION INTRAMUSCULAR; INTRAVENOUS at 02:02

## 2017-11-04 RX ADMIN — HEPARIN SODIUM 5000 UNITS: 5000 INJECTION, SOLUTION INTRAVENOUS; SUBCUTANEOUS at 20:21

## 2017-11-04 ASSESSMENT — PAIN SCALES - GENERAL
PAINLEVEL_OUTOF10: 0
PAINLEVEL_OUTOF10: 0

## 2017-11-04 NOTE — FLOWSHEET NOTE
Pt was anointed     11/03/17 2041   Encounter Summary   Services provided to: Patient and family together   Referral/Consult From: [address] of [address] Visiting Yes  (11/3 )   Complexity of Encounter Low   Length of Encounter 15 minutes   Routine   Type Initial   Assessment Approachable;Calm   Intervention [place];Prayer;Nurtured hope   Outcome Acceptance;Expressed gratitude;Encouraged; Hopeful;Receptive   Sacraments   Sacrament of Sick-Anointing Anointed

## 2017-11-04 NOTE — PROGRESS NOTES
Neurological: Denies dizziness, light headedness, syncope, confusion, numbness, tingling, gait disturbance. Current Meds:  Infusion:    dextrose       Meds:    cephALEXin  250 mg Oral 2 times per day    amLODIPine  10 mg Oral Daily    metoprolol tartrate  50 mg Oral BID    miconazole   Topical BID    sodium bicarbonate  650 mg Oral BID    triamcinolone   Topical BID    sodium chloride flush  10 mL Intravenous 2 times per day    aspirin  81 mg Oral Daily    heparin (porcine)  5,000 Units Subcutaneous 3 times per day     Meds prn: dextrose, sodium chloride flush, acetaminophen, ondansetron, nitroGLYCERIN, glucose, glucagon (rDNA), dextrose     Allergies/Intolerances: ALLERGIES: E-mycin [erythromycin] and Lotensin [benazepril hcl]    Lab Data  CBC:   Recent Labs      11/02/17 2044 11/03/17 0302 11/04/17 0353   WBC  12.9*  12.1*  14.4*   HGB  8.2*  7.1*  7.4*   HCT  25.3*  21.8*  23.0*   PLT  485*  403*  430*     BMP:  Recent Labs      11/02/17 2044 11/03/17 0302 11/04/17 0353   NA  134*  133*  128*   K  3.7  3.3*  4.3   CL  91*  92*  90*   CO2  21*  23  22*   BUN  42*  40*  41*   CREATININE  3.6*  3.7*  3.5*   GLUCOSE  136*  40*  69*   CALCIUM  8.4*  8.1*  8.0*   MG  2.1   --    --      PTH: @PTH@  TSH:   Recent Labs      11/03/17   0842   TSH  0.991     HgBa1c: No results for input(s): LABA1C in the last 72 hours. Hepatic:   Recent Labs      11/02/17 2044   LABALBU  3.0*   AST  15   ALT  9*   BILITOT  0.4   ALKPHOS  111     Lipids:   Recent Labs      11/03/17 0302   CHOL  137   HDL  36     INR: No results for input(s): INR in the last 72 hours. Results for Jesús Talavera (MRN [id number]) as of 11/3/2017 14:18   Ref.  Range 11/2/2017 19:14   Color, UA Latest Ref Range: STRAW-YELL  Yellow   Bilirubin, Urine Latest Ref Range: NEGATIVE  Negative   Ketones, Urine Latest Ref Range: NEGATIVE  Negative   Specific Gravity, UA Latest Ref Range: 1.002 - 1.03  1.025   Blood, Urine Latest No mass or intrahepatic biliary dilatation   Gallbladder/biliary tree: Gallbladder is mild to moderately distended. No calcified gallstones or biliary dilatation. Consider right upper quadrant ultrasound for further evaluation. Spleen: There are a few splenic calcified granulomas. Kidneys/ureters: No obvious mass, cyst, or hydroureteronephrosis. ? No obstructing  tract calculi. Pancreas: No obvious mass or pancreatic ductal dilatation. Other: no ascites, obvious abscess, pathologically enlarged lymph nodes, or mass. Free intraperitoneal air: no pneumoperitoneum. GI tract: There is colonic diverticulosis without diverticulitis. Stomach, small bowel, and colon are unremarkable. Appendix: The appendix is not visualized however there are no findings to suggest acute appendicitis. Vascular: No abdominal aortic aneurysm. ? There are heavy arterial atherosclerotic calcifications. IVC unremarkable. Reproductive: Uterus is absent consistent with hysterectomy. The adnexal regions are unremarkable. Bladder: There is a small amount of air in the bladder probably related to recent instrumentation, correlate clinically. No bladder calculi. Body wall soft tissues:   Musculoskeletal: There are degenerative changes of the lumbar spine with multilevel degenerative disc disease, endplate spondylosis and facet joint DJD. There is 2 to 3 mm retrolisthesis of L2 on L3 and L3 on L4 and 2 to 3 mm anterolisthesis of L4 on L5.    There is 6 to 7 mm anterolisthesis of L5 on S1, likely degenerative in nature. There is bilateral hip joint DJD. .           Impression   Distended gallbladder. No calcified gallstones. Consider right upper quadrant ultrasound for further evaluation. No acute inflammatory or infectious process in the abdomen or pelvis. No bowel obstruction. No urinary tract calculi. No hydroureteronephrosis. Small left and moderate right pleural effusions with bilateral lower lobe atelectasis. conjunctiva, no discharge seen from either eye  Oral: semi dry oral mucus membranes  Ears: normal external appearance of left and right ear  Nose: no active drainage  Neck: No jugular venous distention, appears symmetric, good ROM  Lungs: Air entry B/L, no crackles or rales  Heart: regular rate and rhythm, S1, S2, +7-4/4 systolic murmur noted loudest to LLSB  Extremities: 1+ pitting wang LE edema  GI: soft, non-tender, no guarding, no masses or tenderness  : Not examined, denies incontinence. Skin: no rash seen on exposed extremities  Musculo: moves all extremities  Neuro: no slurred speech, no facial drooping, symmetric strength  Psychiatric: Awake, alert, Orientedx2    Impression and Plan:    1. VIVIANA/CKD IV, likely pre-renal due to volume depletion as she has not been intaking very well orally, infection possibly ATN due to UTI  - improving on IVF, continue D5W at 50 mL/hr, assists with hypoglycemia as well as hydration. - CT abd/pelvis showed no hydro, calculus  - Strict I&O  - Daily weights. - Avoid nephrotoxic medications, including NSAID's, non-emergent IV contrast dye, fleets phospho enema, ACE, ARB and diuretics for now. - Encourage oral intake. - BMP in am.       2. Hypokalemia, 3.3 today, will give KCL 20 meq x 1 dose now. 3. Possible UTI, enteric gm neg bacilli on prelim urine culture. - Asymptomatic.   - Primary service started Rocephin for now. 4. Diabetes, with lability, some hypoglycemia on amaryl. Primary service managing.   - Last HgBA1c was 5.8. Likely some hypoglycemic component as pt is not eating much. 5. Anemia of CKD, Hg 7.1 today, has been running in the 8's, will send iron studies with am labs. 6. Chronic metabolic acidosis, on bicarbonate oral tabs. Continue. 7. Vitamin D deficiency, was supposed to be started on vitamin D replacement as O/P. Will check labs in am.   8. Abdominal pain, resolved, with distended gallbladder on CT. Mgmt per primary service.

## 2017-11-04 NOTE — PROGRESS NOTES
Cardiology Progress Note      Patient: Yudelka Irby  YOB: 1922  MRN: [id number]   Acct: [de-identified]  Admit Date:  11/2/2017  Primary Cardiologist: none  Seen by Dr. Rigoberto Doss    Chief Complaint: This is a pleasant 80 y.o. female hx of CVA, CKD stage 4, HTN, HLD, DM II who presents with self limiting abdominal pain and troponin elevation. Cr is > 3.7, and Trop is 0.230. ECG currently shows NSR, without any significant ST-T changes. She has no current chest pain. She has a distended GB without gallstones on CT. CTA negative. TTE 2016 shows EF 55%, RVSP 57 mmHg, mean aortic valve gradient 22 mmHg, and ALLISON 0.91. There is moderate MR and TR. Coronary status unknown.   SBP 140s        Cardiology asked to see for elevated trop  EKG no acute ischemic changes from prior    Subjective (Events in last 24 hours):     Up in chair- no c/o per pt   Family in room - updated    Pt without abd pain or CP or SOB  Receiving IVF      Objective:   /60   Pulse 60   Temp 98 °F (36.7 °C) (Oral)   Resp 17   Ht 5' 1\" (1.549 m)   Wt 128 lb (58.1 kg)   SpO2 94%   BMI 24.19 kg/m²        TELEMETRY: SR    Physical Exam:  General Appearance: alert and oriented to person, place and time, in no acute distress  Cardiovascular: normal rate, regular rhythm, normal S1 and S2, no murmurs, rubs, clicks, or gallops, distal pulses intact,   Pulmonary/Chest: clear to auscultation bilaterally- no wheezes, rales or rhonchi, normal air movement, no respiratory distress  Abdomen: soft, non-tender, non-distended, normal bowel sounds, no masses Extremities: no cyanosis, clubbing - 1+ pitting LE edema ( normal per family )   edema, pulses present   Skin: warm and dry, no rash or erythema  Head: normocephalic and atraumatic  Neck: supple  Musculoskeletal: normal range of motion, no joint swelling, deformity or tenderness  Neurological: alert, oriented, normal speech, no focal findings or movement disorder noted    Medications:  cephALEXin  250 mg Oral 2 times per day    amLODIPine  10 mg Oral Daily    metoprolol tartrate  50 mg Oral BID    miconazole   Topical BID    sodium bicarbonate  650 mg Oral BID    triamcinolone   Topical BID    sodium chloride flush  10 mL Intravenous 2 times per day    aspirin  81 mg Oral Daily    heparin (porcine)  5,000 Units Subcutaneous 3 times per day      dextrose         dextrose 25 g PRN   sodium chloride flush 10 mL PRN   acetaminophen 650 mg Q4H PRN   ondansetron 4 mg Q6H PRN   nitroGLYCERIN 0.4 mg Q5 Min PRN   glucose 15 g PRN   glucagon (rDNA) 1 mg PRN   dextrose 100 mL/hr PRN       Diagnostics:  EK2017 06:54:07 [facility]  Normal sinus rhythm  Septal infarct , age undetermined  Abnormal ECG  When compared with ECG of 2017 20:32,  Premature atrial complexes are no longer Present  T wave inversion no longer evident in Lateral leads  Confirmed by Viki Abdul ([id number]) on 11/3/2017 12:32:51 PM    Echo:    Electronically signed by Han Cifuentes MD (Interpreting  [address] [place] [address]) on 2017 at 02:05 PM   ----------------------------------------------------------------      Findings      Mitral Valve   Myxomatotic degeneration of mitral valve.   Moderate calcification of the posterior leaflet of the mitral valve.   Mild mitral regurgitation is present.      Aortic Valve   Aortic valve appears tricuspid.   Aortic valve leaflets are Severely calcified.   Thickened aortic valve leaflets noted.   The maximum aortic valve gradient is 28 mmHg, the mean gradient is 17   mmHg, and the peak velocity is 300 cm/s.   There is severe aortic stenosis with valve area of 0.9 sq cm.   Moderate aortic regurgitation is noted.      Tricuspid Valve   Mild tricuspid regurgitation.      Pulmonic Valve   The pulmonic valve was not well visualized .      Left Atrium   Mildly dilated left atrium.      Left Ventricle   Ejection fraction is visually estimated at 45-50%.   There was mild global hypokinesis of the left ventricle.   Mild concentric left ventricular hypertrophy.      Right Atrium   Right atrial size was normal.      Right Ventricle   The right ventricular size was normal with normal systolic function and   wall thickness.   Right ventricular systolic pressure of 55 mm Hg consistent with moderate   pulmonary hypertension.      Pericardial Effusion   The pericardium was normal in appearance with no evidence of a pericardial   effusion.      Pleural Effusion   No evidence of pleural effusion.      Aorta / Great Vessels   -Aortic root dimension within normal limits.   -The Pulmonary artery is within normal limits.   -IVC size is within normal limits with normal respiratory phasic changes          Lab Data:    Cardiac Enzymes:  No results for input(s): CKTOTAL, CKMB, CKMBINDEX, TROPONINI in the last 72 hours.     CBC:   Lab Results   Component Value Date    WBC 14.4 11/04/2017    RBC 2.77 11/04/2017    RBC 3.27 08/10/2017    HGB 7.4 11/04/2017    HCT 23.0 11/04/2017     11/04/2017       CMP:  Lab Results   Component Value Date     11/04/2017    K 4.3 11/04/2017    CL 90 11/04/2017    CO2 22 11/04/2017    BUN 41 11/04/2017    CREATININE 3.5 11/04/2017    LABGLOM 12 11/04/2017    GLUCOSE 69 11/04/2017    GLUCOSE 161 08/10/2017    CALCIUM 8.0 11/04/2017       Hepatic Function Panel:  Lab Results   Component Value Date    ALKPHOS 111 11/02/2017    ALT 9 11/02/2017    AST 15 11/02/2017    PROT 7.6 11/02/2017    BILITOT 0.4 11/02/2017    BILIDIR <0.2 11/02/2017    LABALBU 3.0 11/02/2017       Magnesium:    Lab Results   Component Value Date    MG 2.1 11/02/2017       PT/INR:    Lab Results   Component Value Date    INR 1.08 08/28/2016       HgBA1c:    Lab Results   Component Value Date    LABA1C 5.8 08/10/2017       FLP:  Lab Results   Component Value Date    TRIG 117 11/03/2017    HDL 36 11/03/2017    LDLCALC 78 11/03/2017       TSH:    Lab Results   Component Value Date    TSH 0.991

## 2017-11-05 LAB
ABO: NORMAL
ANION GAP SERPL CALCULATED.3IONS-SCNC: 13 MEQ/L (ref 8–16)
ANTIBODY SCREEN: NORMAL
BUN BLDV-MCNC: 44 MG/DL (ref 7–22)
CALCIUM SERPL-MCNC: 8 MG/DL (ref 8.5–10.5)
CHLORIDE BLD-SCNC: 96 MEQ/L (ref 98–111)
CO2: 20 MEQ/L (ref 23–33)
CREAT SERPL-MCNC: 3.5 MG/DL (ref 0.4–1.2)
GFR SERPL CREATININE-BSD FRML MDRD: 12 ML/MIN/1.73M2
GLUCOSE BLD-MCNC: 142 MG/DL (ref 70–108)
GLUCOSE BLD-MCNC: 144 MG/DL (ref 70–108)
GLUCOSE BLD-MCNC: 181 MG/DL (ref 70–108)
GLUCOSE BLD-MCNC: 182 MG/DL (ref 70–108)
GLUCOSE BLD-MCNC: 64 MG/DL (ref 70–108)
GLUCOSE BLD-MCNC: 88 MG/DL (ref 70–108)
HCT VFR BLD CALC: 21 % (ref 37–47)
HEMOGLOBIN: 6.8 GM/DL (ref 12–16)
MCH RBC QN AUTO: 26.9 PG (ref 27–31)
MCHC RBC AUTO-ENTMCNC: 32.5 GM/DL (ref 33–37)
MCV RBC AUTO: 82.8 FL (ref 81–99)
PATHOLOGIST REVIEW: ABNORMAL
PDW BLD-RTO: 18 % (ref 11.5–14.5)
PLATELET # BLD: 343 THOU/MM3 (ref 130–400)
PMV BLD AUTO: 6.8 MCM (ref 7.4–10.4)
POTASSIUM SERPL-SCNC: 3.7 MEQ/L (ref 3.5–5.2)
RBC # BLD: 2.54 MILL/MM3 (ref 4.2–5.4)
RH FACTOR: NORMAL
SODIUM BLD-SCNC: 129 MEQ/L (ref 135–145)
WBC # BLD: 10.2 THOU/MM3 (ref 4.8–10.8)

## 2017-11-05 PROCEDURE — 86850 RBC ANTIBODY SCREEN: CPT

## 2017-11-05 PROCEDURE — 6360000002 HC RX W HCPCS: Performed by: INTERNAL MEDICINE

## 2017-11-05 PROCEDURE — 2580000003 HC RX 258: Performed by: INTERNAL MEDICINE

## 2017-11-05 PROCEDURE — 6370000000 HC RX 637 (ALT 250 FOR IP): Performed by: INTERNAL MEDICINE

## 2017-11-05 PROCEDURE — G8988 SELF CARE GOAL STATUS: HCPCS

## 2017-11-05 PROCEDURE — 86900 BLOOD TYPING SEROLOGIC ABO: CPT

## 2017-11-05 PROCEDURE — 97166 OT EVAL MOD COMPLEX 45 MIN: CPT

## 2017-11-05 PROCEDURE — 99232 SBSQ HOSP IP/OBS MODERATE 35: CPT | Performed by: INTERNAL MEDICINE

## 2017-11-05 PROCEDURE — 1200000003 HC TELEMETRY R&B

## 2017-11-05 PROCEDURE — 85027 COMPLETE CBC AUTOMATED: CPT

## 2017-11-05 PROCEDURE — G8987 SELF CARE CURRENT STATUS: HCPCS

## 2017-11-05 PROCEDURE — P9016 RBC LEUKOCYTES REDUCED: HCPCS

## 2017-11-05 PROCEDURE — 86923 COMPATIBILITY TEST ELECTRIC: CPT

## 2017-11-05 PROCEDURE — 80048 BASIC METABOLIC PNL TOTAL CA: CPT

## 2017-11-05 PROCEDURE — 36415 COLL VENOUS BLD VENIPUNCTURE: CPT

## 2017-11-05 PROCEDURE — 36430 TRANSFUSION BLD/BLD COMPNT: CPT

## 2017-11-05 PROCEDURE — 86901 BLOOD TYPING SEROLOGIC RH(D): CPT

## 2017-11-05 PROCEDURE — 97530 THERAPEUTIC ACTIVITIES: CPT

## 2017-11-05 PROCEDURE — 82948 REAGENT STRIP/BLOOD GLUCOSE: CPT

## 2017-11-05 RX ORDER — 0.9 % SODIUM CHLORIDE 0.9 %
250 INTRAVENOUS SOLUTION INTRAVENOUS ONCE
Status: COMPLETED | OUTPATIENT
Start: 2017-11-05 | End: 2017-11-05

## 2017-11-05 RX ADMIN — Medication 10 ML: at 20:36

## 2017-11-05 RX ADMIN — MICONAZOLE NITRATE: 2 POWDER TOPICAL at 20:36

## 2017-11-05 RX ADMIN — CEPHALEXIN 250 MG: 250 CAPSULE ORAL at 09:30

## 2017-11-05 RX ADMIN — SODIUM CHLORIDE 250 ML: 9 INJECTION, SOLUTION INTRAVENOUS at 10:29

## 2017-11-05 RX ADMIN — CEPHALEXIN 250 MG: 250 CAPSULE ORAL at 20:36

## 2017-11-05 RX ADMIN — HEPARIN SODIUM 5000 UNITS: 5000 INJECTION, SOLUTION INTRAVENOUS; SUBCUTANEOUS at 06:09

## 2017-11-05 RX ADMIN — HEPARIN SODIUM 5000 UNITS: 5000 INJECTION, SOLUTION INTRAVENOUS; SUBCUTANEOUS at 15:25

## 2017-11-05 RX ADMIN — TRIAMCINOLONE ACETONIDE: 1 CREAM TOPICAL at 09:37

## 2017-11-05 RX ADMIN — METOPROLOL TARTRATE 50 MG: 50 TABLET, FILM COATED ORAL at 20:36

## 2017-11-05 RX ADMIN — SODIUM BICARBONATE 650 MG: 650 TABLET ORAL at 09:30

## 2017-11-05 RX ADMIN — TRIAMCINOLONE ACETONIDE: 1 CREAM TOPICAL at 20:36

## 2017-11-05 RX ADMIN — Medication 10 ML: at 09:31

## 2017-11-05 RX ADMIN — MICONAZOLE NITRATE: 2 POWDER TOPICAL at 09:31

## 2017-11-05 RX ADMIN — SODIUM BICARBONATE 650 MG: 650 TABLET ORAL at 20:36

## 2017-11-05 RX ADMIN — ASPIRIN 81 MG: 81 TABLET, CHEWABLE ORAL at 09:31

## 2017-11-05 RX ADMIN — HEPARIN SODIUM 5000 UNITS: 5000 INJECTION, SOLUTION INTRAVENOUS; SUBCUTANEOUS at 20:38

## 2017-11-05 RX ADMIN — AMLODIPINE BESYLATE 10 MG: 10 TABLET ORAL at 09:31

## 2017-11-05 RX ADMIN — METOPROLOL TARTRATE 50 MG: 50 TABLET, FILM COATED ORAL at 09:30

## 2017-11-05 ASSESSMENT — PAIN SCALES - GENERAL
PAINLEVEL_OUTOF10: 0
PAINLEVEL_OUTOF10: 0

## 2017-11-05 NOTE — PROGRESS NOTES
[facility]    [address], [address] [employer]  [address] [place] Progress Note  11/5/2017 11:43 AM    Pt Name:    Meche Tellez  MRN:     [id number]   [id number]  YOB: 1922  Admit Date:    11/2/2017  6:11 PM  Primary Care Physician:  Garrett Davis MD    Reason for Consult:  CKD    Admit Chief Complaint: Abdominal pain    History:   The patient is a 80y.o. year old frail elderly appearing  female who reports she had abdominal pain. She presented to urgent care and was sent to the ED due to CT abd with distended gallbladder, elevated troponin. She has typically had a low appetite and wasn't drinking much. She is a poor historian due to dementia. She follows with Jeanne Parra for her CKD IV and her baseline creatinine is around 3.0-3.3. She is diabetic on oral meds, has HTN, and previous history of CVA. She has dementia and is a poor historian. Her home meds list includes bicarbonate, glimeperide, amlodipine, metoprolol. She denies fever, chills, light headedness, dizziness, chest pain, shortness of breath, nausea, vomiting, diarrhea. 24 hour summary:  The patient was napping in the bed with her lunch in front of her. She woke up easily. She feels quite weak but denied pain. Her eGFR remains poor. It has for years. Her BUN is not very high as she eats very little and does not generate much BUN. Past Medical History:  Past Medical History:   Diagnosis Date    Cerebral artery occlusion with cerebral infarction ([address]     Chronic kidney disease (CKD), active medical management without dialysis, stage 4 (severe) ([address]     Hyperlipidemia     Hypertension     Type II or unspecified type diabetes mellitus without mention of complication, not stated as uncontrolled        Past Surgical History:  Past Surgical History:   Procedure Laterality Date    GLAUCOMA REPAIR      HYSTERECTOMY         Family History:  History reviewed.  No pertinent family history. Social History:  Social History     Social History    Marital status:      Spouse name: N/A    Number of children: N/A    Years of education: N/A     Occupational History    Not on file. Social History Main Topics    Smoking status: Never Smoker    Smokeless tobacco: Never Used    Alcohol use No    Drug use: No    Sexual activity: Not on file     Other Topics Concern    Not on file     Social History Narrative    No narrative on file       Home Meds:  Prior to Admission medications    Medication Sig Start Date End Date Taking? Authorizing Provider   sodium bicarbonate 650 MG tablet Take 1 tablet by mouth 2 times daily 9/28/17 9/28/18 Yes Cassandra Holt CNP   triamcinolone (KENALOG) 0.1 % cream Apply topically 2 times daily. Patient taking differently: Apply topically 2 times daily prn. 8/10/17  Yes Judge Tyesha MD   amLODIPine (NORVASC) 10 MG tablet Take 1 tablet by mouth daily 5/4/17  Yes Brenden Power MD   glimepiride (AMARYL) 1 MG tablet Take 1 tablet by mouth every morning 5/4/17  Yes Brenden Power MD   metoprolol tartrate (LOPRESSOR) 50 MG tablet Take 1 tablet by mouth 2 times daily 5/4/17  Yes Brenden Power MD   nystatin (MYCOSTATIN) 871643 UNIT/GM powder Apply topically 2 times daily  Patient taking differently: Apply topically 2 times daily as needed  1/17/17  Yes Brenden Power MD       Review of Systems:  Source of data: Patient  Constitutional: Denies fever, chills, Reports + malaise, fatigue. HEENT: Denies visual changes, runny nose, sore throat, dysphagia, [place], tinnitus. Chest: Denies  sputum production, cough. Reports SOB. Cardiovascular: Denies palpitations, chest pain or pressure. GI: Denies abdominal pain, nausea, vomiting, diarrhea, constipation. : Denies hematuria, foamy urine, hesitancy, or painful urination. Skin: Denies wounds, rashes, redness. Musculoskeletal: Denies pain, swelling, tingling/numbness, weakness. Neurological: Denies dizziness, light headedness, syncope, confusion, numbness, tingling, gait disturbance. Current Meds:  Infusion:    dextrose       Meds:    sodium chloride  250 mL Intravenous Once    cephALEXin  250 mg Oral 2 times per day    amLODIPine  10 mg Oral Daily    metoprolol tartrate  50 mg Oral BID    miconazole   Topical BID    sodium bicarbonate  650 mg Oral BID    triamcinolone   Topical BID    sodium chloride flush  10 mL Intravenous 2 times per day    aspirin  81 mg Oral Daily    heparin (porcine)  5,000 Units Subcutaneous 3 times per day     Meds prn: dextrose, sodium chloride flush, acetaminophen, ondansetron, nitroGLYCERIN, glucose, glucagon (rDNA), dextrose     Allergies/Intolerances: ALLERGIES: E-mycin [erythromycin] and Lotensin [benazepril hcl]    Lab Data  CBC:   Recent Labs      11/03/17 0302 11/04/17 0353 11/05/17 0451   WBC  12.1*  14.4*  10.2   HGB  7.1*  7.4*  6.8*   HCT  21.8*  23.0*  21.0*   PLT  403*  430*  343     BMP:  Recent Labs      11/02/17 2044 11/03/17 0302 11/04/17 0353 11/05/17   0451   NA  134*  133*  128*  129*   K  3.7  3.3*  4.3  3.7   CL  91*  92*  90*  96*   CO2  21*  23  22*  20*   BUN  42*  40*  41*  44*   CREATININE  3.6*  3.7*  3.5*  3.5*   GLUCOSE  136*  40*  69*  64*   CALCIUM  8.4*  8.1*  8.0*  8.0*   MG  2.1   --    --    --      PTH: @PTH@  TSH:   Recent Labs      11/03/17   0842   TSH  0.991     HgBa1c: No results for input(s): LABA1C in the last 72 hours. Hepatic:   Recent Labs      11/02/17 2044   LABALBU  3.0*   AST  15   ALT  9*   BILITOT  0.4   ALKPHOS  111     Lipids:   Recent Labs      11/03/17 0302   CHOL  137   HDL  36     INR: No results for input(s): INR in the last 72 hours. Results for Rosalinda Valdez (MRN [id number]) as of 11/3/2017 14:18   Ref.  Range 11/2/2017 19:14   Color, UA Latest Ref Range: STRAW-YELL  Yellow   Bilirubin, Urine Latest Ref Range: NEGATIVE  Negative   Ketones, Urine Latest Ref hydroureteronephrosis. Small left and moderate right pleural effusions with bilateral lower lobe atelectasis. Additional findings as detailed above. 11/3/17 echo   Conclusions      Summary   Ejection fraction is visually estimated at 45-50%.   There was mild global hypokinesis of the left ventricle.   Mild concentric left ventricular hypertrophy.   The right ventricular size was normal with normal systolic function and   wall thickness.   Right ventricular systolic pressure of 55 mm Hg consistent with moderate   pulmonary hypertension.   Aortic valve appears tricuspid.   Aortic valve leaflets are Severely calcified.   Thickened aortic valve leaflets noted.   The maximum aortic valve gradient is 28 mmHg, the mean gradient is 17   mmHg, and the peak velocity is 300 cm/s.   There is severe aortic stenosis with valve area of 0.9 sq cm.   Moderate aortic regurgitation is noted.   Myxomatotic degeneration of mitral valve.   Moderate calcification of the posterior leaflet of the mitral valve.   Mild mitral regurgitation is present. 24HR INTAKE/OUTPUT:      Intake/Output Summary (Last 24 hours) at 11/05/17 1143  Last data filed at 11/05/17 0336   Gross per 24 hour   Intake           725.12 ml   Output             1250 ml   Net          -524.88 ml     I/O last 3 completed shifts: In: 965.1 [P.O.:780; I.V.:185.1]  Out: 1250 [Urine:1250]  No intake/output data recorded. Admission weight: 150 lb (68 kg)  Wt Readings from Last 3 Encounters:   11/05/17 126 lb 12.8 oz (57.5 kg)   08/24/17 129 lb 8 oz (58.7 kg)   08/10/17 120 lb 6.4 oz (54.6 kg)     Body mass index is 23.96 kg/m².     Physical Examination:  VITALS:  /62   Pulse 55   Temp 98 °F (36.7 °C) (Oral)   Resp 16   Ht 5' 1\" (1.549 m)   Wt 126 lb 12.8 oz (57.5 kg)   SpO2 93% Comment: lung clear  BMI 23.96 kg/m²   Weight:   Wt Readings from Last 3 Encounters:   11/05/17 126 lb 12.8 oz (57.5 kg)   08/24/17 129 lb 8 oz (58.7 kg)   08/10/17 120 lb 6.4 oz (54.6 kg)     Constitutional: alert and cooperative with exam, appears comfortable, no distress  Head: NC/AT   Eyes: no icteric sclera, no pallor conjunctiva, no discharge seen from either eye  Oral: semi dry oral mucus membranes  Ears: normal external appearance of left and right ear  Nose: no active drainage  Neck: No jugular venous distention, appears symmetric, good ROM  Lungs: Air entry B/L, no crackles or rales  Heart: regular rate and rhythm, S1, S2, +9-2/5 systolic murmur noted loudest to LLSB  Extremities: 1+ pitting wang LE edema  GI: soft, non-tender, no guarding, no masses or tenderness  : Not examined, denies incontinence. Skin: no rash seen on exposed extremities  Musculo: moves all extremities  Neuro: no slurred speech, no facial drooping, symmetric strength  Psychiatric: Awake, alert, Orientedx2    Impression and Plan:    1. VIVIANA/CKD IV, likely pre-renal due to volume depletion as she has not been intaking very well orally, infection possibly ATN due to UTI  - improving on IVF, continue D5W at 50 mL/hr.  - CT abd/pelvis showed no hydro, calculus  - Strict I&O  - Daily weights. - Avoid nephrotoxic medications, including NSAID's, non-emergent IV contrast dye, fleets phospho enema, ACE, ARB and diuretics for now. - Encourage oral intake. - BMP in am.       2. Hypokalemia, 3.3 today, will give KCL 20 meq x 1 dose now. 3. Possible UTI, enteric gm neg bacilli on prelim urine culture. - Asymptomatic.   - Primary service started Rocephin for now. 4. Diabetes, with lability, some hypoglycemia on amaryl. Primary service managing.   - Last HgBA1c was 5.8. Likely some hypoglycemic component as pt is not eating much. 5. Anemia of CKD, Hg 7.1 today, has been running in the 8's, will send iron studies with am labs. 6. Chronic metabolic acidosis, on bicarbonate oral tabs. Continue. 7. Vitamin D deficiency, was supposed to be started on vitamin D replacement as O/P.  Will check labs in am. 8. Abdominal pain, resolved, with distended gallbladder on CT. Mgmt per primary service. 9. Elevated troponin, cardiology following, echo with EF 45-50%, some mod AR, mod pulm HTN. All labs, radiology and medications were reviewed and discussed with the patient and RN caring for pt. Thank you for the consult. Please feel free to call me if you have any questions.      Shaggy Caba, DO  [facility]

## 2017-11-05 NOTE — PROGRESS NOTES
[address] ICU STEPDOWN TELEMETRY 4K  EVALUATION    Time:  Time In: 6315  Time Out: 1525  Timed Code Treatment Minutes: 17 Minutes  Minutes: 27          Date: 2017  Patient Name: Colleen Lazaro,   Gender: female      MRN: [id number]  : 1922  (80 y.o.)  Referring Practitioner: Jayla Starkey MD  Diagnosis: elevated troponin  Additional Pertinent Hx: Per chart review: 79 y/o female admitted with nausea, abdominal pain and weakness.   Troponins elevated, but now downtrending as of 11/3    Restrictions/Precautions:  Restrictions/Precautions: General Precautions, Fall Risk           Past Medical History:   Diagnosis Date    C[facility]bral [facility] occlusion with [facility]     Chronic kidney disease (CKD), active medical management without dialysis, stage 4 (severe) ([facility]     Hyperlipidemia     Hypertension     Type II or unspecified type diabetes mellitus without mention of complication, not stated as uncontrolled      Past Surgical History:   Procedure Laterality Date    GLAUCOMA REPAIR      HYSTERECTOMY             Subjective  Chart Reviewed: Yes  Patient assessed for rehabilitation services?: Yes  Family / Caregiver Present: Yes (daughter)         General:  Overall Orientation Status: Within Functional Limits    Vision: Impaired    Hearing: Exceptions to [facility] (needs increased volume, pt reports she has hearing aids but currently do not work)         Pain:  Pain Assessment  Patient Currently in Pain: Denies  Pain Assessment: 0-10  Pain Level: 0       Social/Functional:  Lives With: Alone  Type of Home: House  Home Layout: Two level, Able to Live on Main level with bedroom/bathroom  Home Access: Stairs to enter with rails  Entrance Stairs - Number of Steps: 4  Home Equipment: Rolling walker     Bathroom Shower/Tub: Tub/Shower unit, Shower chair without back  Bathroom Toilet: Standard  Bathroom Equipment: Grab bars around toilet  Bathroom strength  Prognosis: Fair  Discharge Recommendations: Continue to assess pending progress    Clinical Decision Making: Clinical Decision making was of Moderate Complexity as the result of analysis of data from a detailed assessment, a consideration of several treatment options, the presence of comorbidities affecting the plan of care and the need for minimal to moderate modifications or assistance required to complete the evaluation. Patient Education:  Patient Education: OT role, poc, goals, safe functional mobility/transfer techniques. Equipment Recommendations: Other: Continue to assess needs. Safety:  Safety Devices in place: Yes  Type of devices: All fall risk precautions in place, Call light within reach, Chair alarm in place, Left in chair, Nurse notified, Gait belt  Restraints  Initially in place: No    Plan:   Times per week: 3-5x  Current Treatment Recommendations: Endurance Training, Strengthening, Balance Training, Safety Education & Training, Self-Care / ADL, Patient/Caregiver Education & Training    Goals:  Patient goals : to get stronger to return home    Short term goals  Time Frame for Short term goals: 1 week  Short term goal 1: PT. to tolerate gentle strengthening exercises to improve BUE strength to 4+/5 for ease with ADL transfers. Short term goal 2: PT. to completed BADL tasks with SBA with use of AE as needed with 0vc for safety. Short term goal 3: Pt. to complete functional mobility to/from bathroom with proper use of AD with Supervision with 0vc for safety. Short term goal 4: PT. to tolerate 5 minutes of standing SBA for increased activity tolerance for sinkside ADLs. Short term goal 5: Pt. to complete BADL transfers with SBA with 0 vc for technique to prevent risk of falls. Long term goals  Time Frame for Long term goals : No LTG d/t short elos.     Evaluation Complexity: Based on the findings of patient history, examination, clinical presentation, and decision making during this evaluation, this patient is of medium complexity. OT G-codes  Functional Assessment Tool Used: [facility], clinical judement  Functional Limitation: Self care  Self Care Current Status (): At least 40 percent but less than 60 percent impaired, limited or restricted  Self Care Goal Status ():  At least 20 percent but less than 40 percent impaired, limited or restricted    AM-PAC Inpatient Daily Activity Raw Score: 15  AM-PAC Inpatient ADL T-Scale Score : 34.69  ADL Inpatient CMS 0-100% Score: 56.46  ADL Inpatient CMS G-Code Modifier : CK

## 2017-11-06 LAB
ANION GAP SERPL CALCULATED.3IONS-SCNC: 17 MEQ/L (ref 8–16)
BUN BLDV-MCNC: 45 MG/DL (ref 7–22)
CALCIUM SERPL-MCNC: 8.2 MG/DL (ref 8.5–10.5)
CHLORIDE BLD-SCNC: 95 MEQ/L (ref 98–111)
CO2: 22 MEQ/L (ref 23–33)
CREAT SERPL-MCNC: 3.5 MG/DL (ref 0.4–1.2)
GFR SERPL CREATININE-BSD FRML MDRD: 12 ML/MIN/1.73M2
GLUCOSE BLD-MCNC: 108 MG/DL (ref 70–108)
GLUCOSE BLD-MCNC: 109 MG/DL (ref 70–108)
GLUCOSE BLD-MCNC: 110 MG/DL (ref 70–108)
GLUCOSE BLD-MCNC: 139 MG/DL (ref 70–108)
HCT VFR BLD CALC: 25 % (ref 37–47)
HEMOGLOBIN: 8.2 GM/DL (ref 12–16)
MCH RBC QN AUTO: 27.8 PG (ref 27–31)
MCHC RBC AUTO-ENTMCNC: 32.9 GM/DL (ref 33–37)
MCV RBC AUTO: 84.4 FL (ref 81–99)
PDW BLD-RTO: 18.1 % (ref 11.5–14.5)
PLATELET # BLD: 331 THOU/MM3 (ref 130–400)
PMV BLD AUTO: 6.6 MCM (ref 7.4–10.4)
POTASSIUM SERPL-SCNC: 3.7 MEQ/L (ref 3.5–5.2)
RBC # BLD: 2.96 MILL/MM3 (ref 4.2–5.4)
SODIUM BLD-SCNC: 134 MEQ/L (ref 135–145)
WBC # BLD: 9.9 THOU/MM3 (ref 4.8–10.8)

## 2017-11-06 PROCEDURE — 6360000002 HC RX W HCPCS: Performed by: INTERNAL MEDICINE

## 2017-11-06 PROCEDURE — 80048 BASIC METABOLIC PNL TOTAL CA: CPT

## 2017-11-06 PROCEDURE — 97110 THERAPEUTIC EXERCISES: CPT

## 2017-11-06 PROCEDURE — 85027 COMPLETE CBC AUTOMATED: CPT

## 2017-11-06 PROCEDURE — 1200000003 HC TELEMETRY R&B

## 2017-11-06 PROCEDURE — 36415 COLL VENOUS BLD VENIPUNCTURE: CPT

## 2017-11-06 PROCEDURE — 82948 REAGENT STRIP/BLOOD GLUCOSE: CPT

## 2017-11-06 PROCEDURE — 97116 GAIT TRAINING THERAPY: CPT

## 2017-11-06 PROCEDURE — 6370000000 HC RX 637 (ALT 250 FOR IP): Performed by: INTERNAL MEDICINE

## 2017-11-06 PROCEDURE — 2580000003 HC RX 258: Performed by: INTERNAL MEDICINE

## 2017-11-06 RX ADMIN — MICONAZOLE NITRATE: 2 POWDER TOPICAL at 20:55

## 2017-11-06 RX ADMIN — ASPIRIN 81 MG: 81 TABLET, CHEWABLE ORAL at 09:07

## 2017-11-06 RX ADMIN — HEPARIN SODIUM 5000 UNITS: 5000 INJECTION, SOLUTION INTRAVENOUS; SUBCUTANEOUS at 20:55

## 2017-11-06 RX ADMIN — Medication 10 ML: at 09:04

## 2017-11-06 RX ADMIN — CEPHALEXIN 250 MG: 250 CAPSULE ORAL at 20:55

## 2017-11-06 RX ADMIN — AMLODIPINE BESYLATE 10 MG: 10 TABLET ORAL at 09:07

## 2017-11-06 RX ADMIN — MICONAZOLE NITRATE: 2 POWDER TOPICAL at 09:03

## 2017-11-06 RX ADMIN — HEPARIN SODIUM 5000 UNITS: 5000 INJECTION, SOLUTION INTRAVENOUS; SUBCUTANEOUS at 06:21

## 2017-11-06 RX ADMIN — TRIAMCINOLONE ACETONIDE: 1 CREAM TOPICAL at 09:07

## 2017-11-06 RX ADMIN — SODIUM BICARBONATE 650 MG: 650 TABLET ORAL at 20:55

## 2017-11-06 RX ADMIN — SODIUM BICARBONATE 650 MG: 650 TABLET ORAL at 09:07

## 2017-11-06 RX ADMIN — Medication 10 ML: at 20:55

## 2017-11-06 RX ADMIN — CEPHALEXIN 250 MG: 250 CAPSULE ORAL at 09:07

## 2017-11-06 RX ADMIN — METOPROLOL TARTRATE 50 MG: 50 TABLET, FILM COATED ORAL at 09:07

## 2017-11-06 RX ADMIN — TRIAMCINOLONE ACETONIDE: 1 CREAM TOPICAL at 20:55

## 2017-11-06 RX ADMIN — METOPROLOL TARTRATE 50 MG: 50 TABLET, FILM COATED ORAL at 20:55

## 2017-11-06 RX ADMIN — HEPARIN SODIUM 5000 UNITS: 5000 INJECTION, SOLUTION INTRAVENOUS; SUBCUTANEOUS at 15:02

## 2017-11-06 ASSESSMENT — PAIN SCALES - GENERAL
PAINLEVEL_OUTOF10: 0

## 2017-11-06 NOTE — CARE COORDINATION
11/6/17, 9:57 AM    DISCHARGE BARRIERS      SW received an order \"patient wants [facility] at d/c\". Please refer to full assessment completed on Nov 3.  MICHELLE spoke to patient and she is in agreement for SW to make referral to [facility]. MICHELLE spoke to [facility] with ECF, they have a bed and they will review, but do not for see a problem.

## 2017-11-06 NOTE — PROGRESS NOTES
heelslides, hip abd/add, quad sets, anklepumps, and seated long arc quads and marches. Each x 10 reps for strengthening to improve functional mobility. Activity Tolerance:  Activity Tolerance: Patient Tolerated treatment well;Patient limited by fatigue;Patient limited by endurance    Assessment: Body structures, Functions, Activity limitations: Decreased functional mobility , Decreased endurance, Decreased balance, Decreased strength, Decreased safe awareness, Decreased cognition  Assessment: Pt participated well and tolerated exercises and mobility with only a couple rest breaks. Pt is limited with safety, mobility, and some cognitively making it unsafe for her to return home alone. Recommend continued skilled PT at SNF/ECF. Prognosis: Fair  REQUIRES PT FOLLOW UP: Yes  Discharge Recommendations: Continue to assess pending progress, ECF with PT, Patient would benefit from continued therapy after discharge, [address]    Patient Education:  Patient Education: plan of care, LE exercises    Equipment Recommendations: Other: will continue to assess    Safety:  Type of devices: All fall risk precautions in place, Left in chair, Call light within reach, Nurse notified, Chair alarm in place, Gait belt    Plan:  Times per week: 3-5x GM  Current Treatment Recommendations: Strengthening, Safety Education & Training, Balance Training, Patient/Caregiver Education & Training, Endurance Training, Functional Mobility Training, Equipment Evaluation, Education, & procurement, Transfer Training, Gait Training    Goals:  Patient goals : Get stronger    Short term goals  Time Frame for Short term goals: 2 weeks  Short term goal 1: Patient will complete bed mobility with mod I. Short term goal 2: Patient will transfer sit <--> stand with SBA from various surfaces. Short term goal 3: Patient will ambulate 75 ft with a RW and SBA in order to ambulate safely in her home.   Short term goal 4: Patient will tolerate 15 reps B LE strengthening exercises in order to promote increased independence with mobility. Long term goals  Time Frame for Long term goals : No LTGs due to estimated length of stay         AM-PAC Inpatient Mobility without Stair Climbing Raw Score : 15  AM-PAC Inpatient without Stair Climbing T-Scale Score : 43.03  Mobility Inpatient CMS 0-100% Score: 47.43  Mobility Inpatient without Stair CMS G-Code Modifier : MARICRUZ [facility], [address]

## 2017-11-07 VITALS
DIASTOLIC BLOOD PRESSURE: 68 MMHG | OXYGEN SATURATION: 89 % | RESPIRATION RATE: 16 BRPM | TEMPERATURE: 98.7 F | HEIGHT: 61 IN | WEIGHT: 125.56 LBS | BODY MASS INDEX: 23.7 KG/M2 | SYSTOLIC BLOOD PRESSURE: 137 MMHG | HEART RATE: 62 BPM

## 2017-11-07 LAB — GLUCOSE BLD-MCNC: 103 MG/DL (ref 70–108)

## 2017-11-07 PROCEDURE — 6370000000 HC RX 637 (ALT 250 FOR IP): Performed by: INTERNAL MEDICINE

## 2017-11-07 PROCEDURE — 82948 REAGENT STRIP/BLOOD GLUCOSE: CPT

## 2017-11-07 PROCEDURE — 2580000003 HC RX 258: Performed by: INTERNAL MEDICINE

## 2017-11-07 PROCEDURE — 6360000002 HC RX W HCPCS: Performed by: INTERNAL MEDICINE

## 2017-11-07 PROCEDURE — 99232 SBSQ HOSP IP/OBS MODERATE 35: CPT | Performed by: INTERNAL MEDICINE

## 2017-11-07 RX ORDER — NITROGLYCERIN 0.4 MG/1
TABLET SUBLINGUAL
Qty: 25 TABLET | Refills: 3 | DISCHARGE
Start: 2017-11-07

## 2017-11-07 RX ORDER — CEPHALEXIN 250 MG/1
250 CAPSULE ORAL EVERY 12 HOURS SCHEDULED
DISCHARGE
Start: 2017-11-07 | End: 2017-11-12

## 2017-11-07 RX ORDER — ASPIRIN 81 MG/1
81 TABLET, CHEWABLE ORAL DAILY
Qty: 30 TABLET | Refills: 3 | DISCHARGE
Start: 2017-11-07

## 2017-11-07 RX ORDER — ACETAMINOPHEN 325 MG/1
650 TABLET ORAL EVERY 6 HOURS PRN
Qty: 120 TABLET | Refills: 3 | DISCHARGE
Start: 2017-11-07

## 2017-11-07 RX ADMIN — METOPROLOL TARTRATE 50 MG: 50 TABLET, FILM COATED ORAL at 10:12

## 2017-11-07 RX ADMIN — CEPHALEXIN 250 MG: 250 CAPSULE ORAL at 10:13

## 2017-11-07 RX ADMIN — MICONAZOLE NITRATE: 2 POWDER TOPICAL at 10:00

## 2017-11-07 RX ADMIN — SODIUM BICARBONATE 650 MG: 650 TABLET ORAL at 10:13

## 2017-11-07 RX ADMIN — Medication 10 ML: at 10:12

## 2017-11-07 RX ADMIN — AMLODIPINE BESYLATE 10 MG: 10 TABLET ORAL at 10:13

## 2017-11-07 RX ADMIN — HEPARIN SODIUM 5000 UNITS: 5000 INJECTION, SOLUTION INTRAVENOUS; SUBCUTANEOUS at 06:35

## 2017-11-07 RX ADMIN — ASPIRIN 81 MG: 81 TABLET, CHEWABLE ORAL at 10:14

## 2017-11-07 ASSESSMENT — PAIN SCALES - GENERAL: PAINLEVEL_OUTOF10: 0

## 2017-11-07 NOTE — DISCHARGE SUMMARY
Discharge Summary    Colleen Lazaro  :  1922  MRN:  [id number]    Admit date:  2017  Discharge date:      Admitting Physician:  Jayla Starkey MD    Discharge Diagnoses:      1. abd pain, non specific, resolved  2. Elevated troponin in the setting of CKD. 3. HTN  4. VIVIANA  5. Baseline CKD stage 4  6. DM 2 with hypoglycemia. 7. UTI  8. Anemia of chronic dx and iron def-renal dx, receiving prbc x1  9. Chronic systolic CHF  10. Severe Aortic stenosis  11. Hyponatremia, improved     Admission Condition:  serious  Discharged Condition:  good    Discharge Medications: Shazia Morataya   [place] Medication Instructions [id number]    Printed on:17 1000   Medication Information                      acetaminophen (TYLENOL) 325 MG tablet  Take 2 tablets by mouth every 6 hours as needed for Pain or Fever             amLODIPine (NORVASC) 10 MG tablet  Take 1 tablet by mouth daily             aspirin 81 MG chewable tablet  Take 1 tablet by mouth daily             cephALEXin (KEFLEX) 250 MG capsule  Take 1 capsule by mouth every 12 hours for 5 days             metoprolol tartrate (LOPRESSOR) 50 MG tablet  Take 1 tablet by mouth 2 times daily             nitroGLYCERIN (NITROSTAT) 0.4 MG SL tablet  up to max of 3 total doses. If no relief after 1 dose, call 911.             nystatin (MYCOSTATIN) 925302 UNIT/GM powder  Apply topically 2 times daily             sodium bicarbonate 650 MG tablet  Take 1 tablet by mouth 2 times daily             triamcinolone (KENALOG) 0.1 % cream  Apply topically 2 times daily.                  Consults:  cardiology and nephrology    Significant Diagnostic Studies: labs: CBC:        Recent Labs      171  17   WBC  10.2  9.9   HGB  6.8*  8.2*   PLT  343  331      BMP:         Recent Labs      17   NA  129*  134*   K  3.7  3.7   CL  96*  95*   CO2  20*  22*   BUN  44*  45*   CREATININE  3.5*  3.5*   GLUCOSE  64*  109*    Magnesium:          Lab Results   Component Value Date     MG 2.1 11/02/2017      HgBA1c:          Lab Results   Component Value Date     LABA1C 5.8 08/10/2017      TSH:          Lab Results   Component Value Date     TSH 0.991 11/03/2017      IRON:          Lab Results   Component Value Date     IRON 17 11/04/2017      FERRITIN:          Lab Results   Component Value Date     FERRITIN 212 11/03/2017      Transferrin 182 (L)      Soluble Transferrin Recept 4.7 (H)                 Escherichia coli     Urine Culture Reflex 11/02/2017  7:14 PM [facility]   [place] count: >100,000 CFU/mL    cullture & Susceptibility   ESCHERICHIA COLI      Antibiotic Interpretation HOSSEIN Unit Status   cefOXitin Sensitive <=4 mcg/mL Final   cefuroxime Sensitive     Final   gentamicin Sensitive <=1 mcg/mL Final   nitrofurantoin Sensitive <=16 mcg/mL Final   tetracycline Sensitive <=1 mcg/mL Final   trimethoprim-sulfamethoxazole Sensitive <=20 mcg/mL Final               Assessment and Plan:   12. abd pain, non specific, resolved  13. Elevated troponin in the setting of CKD. 14. HTN  15. VIVIANA  16. Baseline CKD stage 4  17. DM 2 with hypoglycemia, stopped amaryl  18. UTI  19. Anemia of chronic dx and iron def-renal dx, receiving prbc x1  20. Chronic systolic CHF  21. Severe Aortic stenosis  22. Hyponatremia, improved     pepcid. Complete antibiotics po keflex. Dc to SNF today.     Treatments:   Laxative, prbc transfusion x1, cautious diuresis, antibiotic for UTI    Disposition:  SNF    Signed:  Leda Lan  11/7/2017, 10:00 AM

## 2017-11-07 NOTE — PROGRESS NOTES
[facility]    [address], [address]  [address] [place] Progress Note  11/7/2017 1:23 PM    Pt Name:    Henrietta Marshall  MRN:     [id number]   [id number]  YOB: 1922  Admit Date:    11/2/2017  6:11 PM  Primary Care Physician:  Anson Venegas MD    Reason for Consult:  CKD    Admit Chief Complaint: Abdominal pain    History:   The patient is a 80y.o. year old frail elderly appearing  female who reports she had abdominal pain. She presented to urgent care and was sent to the ED due to CT abd with distended gallbladder, elevated troponin. She has typically had a low appetite and wasn't drinking much. She is a poor historian due to dementia. She follows with Shahana Davidson for her CKD IV and her baseline creatinine is around 3.0-3.3. She is diabetic on oral meds, has HTN, and previous history of CVA. She has dementia and is a poor historian. Her home meds list includes bicarbonate, glimeperide, amlodipine, metoprolol. She denies fever, chills, light headedness, dizziness, chest pain, shortness of breath, nausea, vomiting, diarrhea. 24 hour summary:  The patient was sitting in the beside chair with her lunch in front of her. She feels quite weak but denied pain. Her eGFR remains poor. It has for years. Her BUN is not very high as she eats very little and does not generate much BUN. Past Medical History:  Past Medical History:   Diagnosis Date    Cerebral artery occlusion with cerebral infarction ([facility] [address]     Chronic kidney disease (CKD), active medical management without dialysis, stage 4 (severe) ([address]     Hyperlipidemia     Hypertension     Type II or unspecified type diabetes mellitus without mention of complication, not stated as uncontrolled        Past Surgical History:  Past Surgical History:   Procedure Laterality Date    GLAUCOMA REPAIR      HYSTERECTOMY         Family History:  History reviewed.  No pertinent family history. Social History:  Social History     Social History    Marital status:      Spouse name: N/A    Number of children: N/A    Years of education: N/A     Occupational History    Not on file. Social History Main Topics    Smoking status: Never Smoker    Smokeless tobacco: Never Used    Alcohol use No    Drug use: No    Sexual activity: Not on file     Other Topics Concern    Not on file     Social History Narrative    No narrative on file       Home Meds:  Prior to Admission medications    Medication Sig Start Date End Date Taking? Authorizing Provider   acetaminophen (TYLENOL) 325 MG tablet Take 2 tablets by mouth every 6 hours as needed for Pain or Fever 11/7/17  Yes Asmita Chris MD   aspirin 81 MG chewable tablet Take 1 tablet by mouth daily 11/7/17  Yes Asmita Chris MD   nitroGLYCERIN (NITROSTAT) 0.4 MG SL tablet up to max of 3 total doses. If no relief after 1 dose, call 911. 11/7/17  Yes Asmita Chris MD   cephALEXin (KEFLEX) 250 MG capsule Take 1 capsule by mouth every 12 hours for 5 days 11/7/17 11/12/17 Yes Asmita Chris MD   sodium bicarbonate 650 MG tablet Take 1 tablet by mouth 2 times daily 9/28/17 9/28/18 Yes Anna Marie Baker CNP   triamcinolone (KENALOG) 0.1 % cream Apply topically 2 times daily. Patient taking differently: Apply topically 2 times daily prn. 8/10/17  Yes Eunice Jacobsen MD   amLODIPine (NORVASC) 10 MG tablet Take 1 tablet by mouth daily 5/4/17  Yes Sandro Benitez MD   metoprolol tartrate (LOPRESSOR) 50 MG tablet Take 1 tablet by mouth 2 times daily 5/4/17  Yes Sandro Benitez MD   nystatin (MYCOSTATIN) 044699 UNIT/GM powder Apply topically 2 times daily  Patient taking differently: Apply topically 2 times daily as needed  1/17/17  Yes Sandro Benitez MD       Review of Systems:  Source of data: Patient  Constitutional: Denies fever, chills, Reports + malaise, fatigue.    HEENT: Denies visual changes, runny nose, sore throat, dysphagia, [place], tinnitus. Chest: Denies  sputum production, cough. Reports SOB. Cardiovascular: Denies palpitations, chest pain or pressure. GI: Denies abdominal pain, nausea, vomiting, diarrhea, constipation. : Denies hematuria, foamy urine, hesitancy, or painful urination. Skin: Denies wounds, rashes, redness. Musculoskeletal: Denies pain, swelling, tingling/numbness, weakness. Neurological: Denies dizziness, light headedness, syncope, confusion, numbness, tingling, gait disturbance. Current Meds:  Infusion:    dextrose       Meds:    influenza virus vaccine  0.5 mL Intramuscular Once    cephALEXin  250 mg Oral 2 times per day    amLODIPine  10 mg Oral Daily    metoprolol tartrate  50 mg Oral BID    miconazole   Topical BID    sodium bicarbonate  650 mg Oral BID    triamcinolone   Topical BID    sodium chloride flush  10 mL Intravenous 2 times per day    aspirin  81 mg Oral Daily    heparin (porcine)  5,000 Units Subcutaneous 3 times per day     Meds prn: dextrose, sodium chloride flush, acetaminophen, ondansetron, nitroGLYCERIN, glucose, glucagon (rDNA), dextrose     Allergies/Intolerances: ALLERGIES: E-mycin [erythromycin] and Lotensin [benazepril hcl]    Lab Data  CBC:   Recent Labs      11/05/17 0451 11/06/17   0356   WBC  10.2  9.9   HGB  6.8*  8.2*   HCT  21.0*  25.0*   PLT  343  331     BMP:  Recent Labs      11/05/17 0451 11/06/17   0356   NA  129*  134*   K  3.7  3.7   CL  96*  95*   CO2  20*  22*   BUN  44*  45*   CREATININE  3.5*  3.5*   GLUCOSE  64*  109*   CALCIUM  8.0*  8.2*     PTH: @PTH@  TSH:   No results for input(s): TSH in the last 72 hours. HgBa1c: No results for input(s): LABA1C in the last 72 hours. Hepatic:   No results for input(s): LABALBU, AST, ALT, ALB, BILITOT, ALKPHOS in the last 72 hours. Lipids:   No results for input(s): CHOL, HDL in the last 72 hours. Invalid input(s): LDLCALCU  INR: No results for input(s): INR in the last 72 hours.      Results for replacement as O/P. Will check labs in am.   8. Abdominal pain, resolved, with distended gallbladder on CT. Mgmt per primary service. 9. Elevated troponin, cardiology following, echo with EF 45-50%, some mod AR, mod pulm HTN. All labs, radiology and medications were reviewed and discussed with the patient and RN caring for pt. Thank you for the consult. Please feel free to call me if you have any questions.      Zohaib Hdez, DO  [facility]

## 2017-11-07 NOTE — PROGRESS NOTES
Reported off to primary CONSTANTINE Kaye. Patient resting in chair. Pathways ensured. Respirations easy and unlabored. Patient states they are comfortable, call light in reach.     Electronically signed by Brain FRITZ on 11/7/2017 at 2:11 PM

## 2017-11-07 NOTE — PROGRESS NOTES
11/03/2017     Transferrin 182 (L)     Soluble Transferrin Recept 4.7 (H)         Escherichia coli     Urine Culture Reflex 11/02/2017  7:14 PM  - [facility]   [place] count: >100,000 CFU/mL    cullture & Susceptibility   ESCHERICHIA COLI     Antibiotic Interpretation HOSSEIN Unit Status   cefOXitin Sensitive <=4 mcg/mL Final   cefuroxime Sensitive   Final   gentamicin Sensitive <=1 mcg/mL Final   nitrofurantoin Sensitive <=16 mcg/mL Final   tetracycline Sensitive <=1 mcg/mL Final   trimethoprim-sulfamethoxazole Sensitive <=20 mcg/mL Final            Assessment and Plan:   1. abd pain, non specific  2. Elevated troponin in the setting of CKD. 3. HTN  4. VIVIANA  5. Baseline CKD stage 4  6. DM 2 with hypoglycemia. 7. UTI  8. Anemia of chronic dx and iron def-renal dx, receiving prbc x1  9. Chronic systolic CHF  10. Severe Aortic stenosis     Keep on pepcid. Antibiotics po keflex. PT/OT  Awaiting SNF bed.     Estevan Ahmadi MD

## 2017-11-07 NOTE — PROGRESS NOTES
Report called to Nini NARANJO at [facility] in [employer]. AVS and last dose MAR faxed to [facility]. No concerns.

## 2017-11-07 NOTE — PROGRESS NOTES
Palliative care in to see pt. Pt currently sitting up in bedside chair, awake and alert. States that shes' feeling \"good\". No visitors here at present. Dr Rebecca Gutiérrez on unit and plan is for discharge to [facility] today with skilled benefit. [facility] DNR form completed by Dr Rebecca Gutiérrez for [facility]. Faxed to medical records to be scanned into our system, copy placed in physical chart under advance directives tab. Original placed with paperwork to go with pt at discharge. Pt's primary nurse,JOSE A Hinkle updated on above. Call placed to pt's daughter, Del Nicholson, to review code status. Del Lyn requests DNR-CCA to continue at [facility] and writer updated her that [place] DNR form for [facility] is with paperwork for ECF and needs to be reviewed by them. Del Lyn expresses understanding and states no further questions or needs.

## 2017-11-07 NOTE — PLAN OF CARE
Problem: Falls - Risk of  Goal: Absence of falls  Outcome: Ongoing  Absent of falls this shift. Up with 2 assist, forgets limitations & forgets to use walker. Unsteady on feet. Problem: Risk for Impaired Skin Integrity  Goal: Tissue integrity - skin and mucous membranes  Structural intactness and normal physiological function of skin and  mucous membranes. Outcome: Ongoing  Allevyn on coccyx. Buttocks red, heels red and blanchable. Mucous membranes pink & dry. Problem: Confusion - Acute:  Goal: Mental status will be restored to baseline  Mental status will be restored to baseline   Outcome: Ongoing  Forgets limitations & overestimates. Oriented to person, place, disoriented to situation & time. Problem: Fluid Volume - Imbalance:  Goal: Absence of imbalanced fluid volume signs and symptoms  Absence of imbalanced fluid volume signs and symptoms   Outcome: Ongoing  Trace edema in bilat lower extrem. Urine output adequate. Problem: Discharge Planning:  Goal: Participates in care planning  Participates in care planning   Outcome: Ongoing  Pt participates to best of her ability. Problem: DISCHARGE BARRIERS  Goal: Patient's continuum of care needs are met  Outcome: Ongoing  Pt plans to be dc'd to [facility]    Problem: Musculor/Skeletal Functional Status  Goal: Highest potential functional level  Outcome: Ongoing  Pt functions to the best of her ability, uses help when needed. Comments: Care plan reviewed with patient. Patient verbalize understanding of the plan of care and contribute to goal setting.

## 2017-11-07 NOTE — PROGRESS NOTES
INTERNAL MEDICINE Progress Note  11/7/2017 9:55 AM  Subjective:   Admit Date: 11/2/2017  PCP: Braeden Gordillo MD  Interval History:   no new c/o    Objective:   Vitals: BP (!) 154/71   Pulse 66   Temp 98.1 °F (36.7 °C) (Oral)   Resp 16   Ht 5' 1\" (1.549 m)   Wt 125 lb 9 oz (57 kg)   SpO2 (!) 89%   BMI 23.72 kg/m²   General appearance: alert and cooperative with exam  HEENT: atraumatic  Neck: no adenopathy and no carotid bruit  Lungs: diminished breath sounds bibasilar  Heart: S1, S2 normal  Abdomen: soft, non-tender; bowel sounds normal; no masses,  no organomegaly  Extremities: no edema,   Neurologic:  alert, orientation: person, affect: flat      Medications:   Scheduled Meds:   influenza virus vaccine  0.5 mL Intramuscular Once    cephALEXin  250 mg Oral 2 times per day    amLODIPine  10 mg Oral Daily    metoprolol tartrate  50 mg Oral BID    miconazole   Topical BID    sodium bicarbonate  650 mg Oral BID    triamcinolone   Topical BID    sodium chloride flush  10 mL Intravenous 2 times per day    aspirin  81 mg Oral Daily    heparin (porcine)  5,000 Units Subcutaneous 3 times per day     Continuous Infusions:   dextrose         Lab Results:   CBC:   Recent Labs      11/05/17 0451  11/06/17   0356   WBC  10.2  9.9   HGB  6.8*  8.2*   PLT  343  331     BMP:    Recent Labs      11/05/17 0451  11/06/17   0356   NA  129*  134*   K  3.7  3.7   CL  96*  95*   CO2  20*  22*   BUN  44*  45*   CREATININE  3.5*  3.5*   GLUCOSE  64*  109*     Magnesium:    Lab Results   Component Value Date    MG 2.1 11/02/2017     HgBA1c:    Lab Results   Component Value Date    LABA1C 5.8 08/10/2017     TSH:    Lab Results   Component Value Date    TSH 0.991 11/03/2017     IRON:    Lab Results   Component Value Date    IRON 17 11/04/2017     FERRITIN:    Lab Results   Component Value Date    FERRITIN 212 11/03/2017     Transferrin 182 (L)     Soluble Transferrin Recept 4.7 (H)         Escherichia coli     Urine

## 2019-08-14 NOTE — CARE COORDINATION
11/6/17, 2:36 PM      [facility] day: 3  Location: -03/003-A Reason for admit: Elevated troponin [R74.8]   Treatment Plan of Care: Creat 3.5, Hgb 8.2. Josefa received 2 units PRBC yesterday. Nephrology following. Cardio signed off. Working with PT/OT. Anticipate discharge soon.    Core Measures: vte  PCP: Zachariah Peters MD  Discharge Plan: Plan admission to [facility]. Yes!